# Patient Record
Sex: FEMALE | Race: WHITE | NOT HISPANIC OR LATINO | URBAN - METROPOLITAN AREA
[De-identification: names, ages, dates, MRNs, and addresses within clinical notes are randomized per-mention and may not be internally consistent; named-entity substitution may affect disease eponyms.]

---

## 2021-02-09 ENCOUNTER — INPATIENT (INPATIENT)
Facility: HOSPITAL | Age: 76
LOS: 3 days | Discharge: HOME HEALTH | End: 2021-02-13
Attending: STUDENT IN AN ORGANIZED HEALTH CARE EDUCATION/TRAINING PROGRAM | Admitting: STUDENT IN AN ORGANIZED HEALTH CARE EDUCATION/TRAINING PROGRAM
Payer: MEDICARE

## 2021-02-09 VITALS
RESPIRATION RATE: 16 BRPM | WEIGHT: 125 LBS | HEART RATE: 69 BPM | SYSTOLIC BLOOD PRESSURE: 167 MMHG | DIASTOLIC BLOOD PRESSURE: 72 MMHG | OXYGEN SATURATION: 98 % | TEMPERATURE: 98 F

## 2021-02-09 DIAGNOSIS — Z98.82 BREAST IMPLANT STATUS: Chronic | ICD-10-CM

## 2021-02-09 LAB
ALBUMIN SERPL ELPH-MCNC: 4.2 G/DL — SIGNIFICANT CHANGE UP (ref 3.5–5.2)
ALP SERPL-CCNC: 71 U/L — SIGNIFICANT CHANGE UP (ref 30–115)
ALT FLD-CCNC: 43 U/L — HIGH (ref 0–41)
ANION GAP SERPL CALC-SCNC: 19 MMOL/L — HIGH (ref 7–14)
APPEARANCE UR: CLEAR — SIGNIFICANT CHANGE UP
APTT BLD: 27.6 SEC — SIGNIFICANT CHANGE UP (ref 27–39.2)
AST SERPL-CCNC: 68 U/L — HIGH (ref 0–41)
BACTERIA # UR AUTO: NEGATIVE — SIGNIFICANT CHANGE UP
BASOPHILS # BLD AUTO: 0.04 K/UL — SIGNIFICANT CHANGE UP (ref 0–0.2)
BASOPHILS NFR BLD AUTO: 0.4 % — SIGNIFICANT CHANGE UP (ref 0–1)
BILIRUB SERPL-MCNC: 0.6 MG/DL — SIGNIFICANT CHANGE UP (ref 0.2–1.2)
BILIRUB UR-MCNC: NEGATIVE — SIGNIFICANT CHANGE UP
BLD GP AB SCN SERPL QL: SIGNIFICANT CHANGE UP
BUN SERPL-MCNC: 27 MG/DL — HIGH (ref 10–20)
CALCIUM SERPL-MCNC: 9.7 MG/DL — SIGNIFICANT CHANGE UP (ref 8.5–10.1)
CHLORIDE SERPL-SCNC: 95 MMOL/L — LOW (ref 98–110)
CK SERPL-CCNC: 186 U/L — SIGNIFICANT CHANGE UP (ref 0–225)
CO2 SERPL-SCNC: 23 MMOL/L — SIGNIFICANT CHANGE UP (ref 17–32)
COLOR SPEC: SIGNIFICANT CHANGE UP
CREAT SERPL-MCNC: 1.1 MG/DL — SIGNIFICANT CHANGE UP (ref 0.7–1.5)
DIFF PNL FLD: NEGATIVE — SIGNIFICANT CHANGE UP
EOSINOPHIL # BLD AUTO: 0 K/UL — SIGNIFICANT CHANGE UP (ref 0–0.7)
EOSINOPHIL NFR BLD AUTO: 0 % — SIGNIFICANT CHANGE UP (ref 0–8)
EPI CELLS # UR: 1 /HPF — SIGNIFICANT CHANGE UP (ref 0–5)
ETHANOL SERPL-MCNC: <10 MG/DL — SIGNIFICANT CHANGE UP
GLUCOSE SERPL-MCNC: 87 MG/DL — SIGNIFICANT CHANGE UP (ref 70–99)
GLUCOSE UR QL: NEGATIVE — SIGNIFICANT CHANGE UP
HCT VFR BLD CALC: 39.6 % — SIGNIFICANT CHANGE UP (ref 37–47)
HGB BLD-MCNC: 13.9 G/DL — SIGNIFICANT CHANGE UP (ref 12–16)
HYALINE CASTS # UR AUTO: 6 /LPF — SIGNIFICANT CHANGE UP (ref 0–7)
IMM GRANULOCYTES NFR BLD AUTO: 0.6 % — HIGH (ref 0.1–0.3)
INR BLD: 1.05 RATIO — SIGNIFICANT CHANGE UP (ref 0.65–1.3)
KETONES UR-MCNC: ABNORMAL
LACTATE SERPL-SCNC: 1.3 MMOL/L — SIGNIFICANT CHANGE UP (ref 0.7–2)
LEUKOCYTE ESTERASE UR-ACNC: NEGATIVE — SIGNIFICANT CHANGE UP
LYMPHOCYTES # BLD AUTO: 1.05 K/UL — LOW (ref 1.2–3.4)
LYMPHOCYTES # BLD AUTO: 9.3 % — LOW (ref 20.5–51.1)
MCHC RBC-ENTMCNC: 30.9 PG — SIGNIFICANT CHANGE UP (ref 27–31)
MCHC RBC-ENTMCNC: 35.1 G/DL — SIGNIFICANT CHANGE UP (ref 32–37)
MCV RBC AUTO: 88 FL — SIGNIFICANT CHANGE UP (ref 81–99)
MONOCYTES # BLD AUTO: 0.66 K/UL — HIGH (ref 0.1–0.6)
MONOCYTES NFR BLD AUTO: 5.8 % — SIGNIFICANT CHANGE UP (ref 1.7–9.3)
NEUTROPHILS # BLD AUTO: 9.53 K/UL — HIGH (ref 1.4–6.5)
NEUTROPHILS NFR BLD AUTO: 83.9 % — HIGH (ref 42.2–75.2)
NITRITE UR-MCNC: NEGATIVE — SIGNIFICANT CHANGE UP
NRBC # BLD: 0 /100 WBCS — SIGNIFICANT CHANGE UP (ref 0–0)
PH UR: 6 — SIGNIFICANT CHANGE UP (ref 5–8)
PLATELET # BLD AUTO: 225 K/UL — SIGNIFICANT CHANGE UP (ref 130–400)
POTASSIUM SERPL-MCNC: 3.5 MMOL/L — SIGNIFICANT CHANGE UP (ref 3.5–5)
POTASSIUM SERPL-SCNC: 3.5 MMOL/L — SIGNIFICANT CHANGE UP (ref 3.5–5)
PROT SERPL-MCNC: 6.5 G/DL — SIGNIFICANT CHANGE UP (ref 6–8)
PROT UR-MCNC: ABNORMAL
PROTHROM AB SERPL-ACNC: 12.1 SEC — SIGNIFICANT CHANGE UP (ref 9.95–12.87)
RAPID RVP RESULT: SIGNIFICANT CHANGE UP
RBC # BLD: 4.5 M/UL — SIGNIFICANT CHANGE UP (ref 4.2–5.4)
RBC # FLD: 12.2 % — SIGNIFICANT CHANGE UP (ref 11.5–14.5)
RBC CASTS # UR COMP ASSIST: 2 /HPF — SIGNIFICANT CHANGE UP (ref 0–4)
SARS-COV-2 RNA SPEC QL NAA+PROBE: SIGNIFICANT CHANGE UP
SODIUM SERPL-SCNC: 137 MMOL/L — SIGNIFICANT CHANGE UP (ref 135–146)
SP GR SPEC: 1.02 — SIGNIFICANT CHANGE UP (ref 1.01–1.03)
UROBILINOGEN FLD QL: SIGNIFICANT CHANGE UP
WBC # BLD: 11.35 K/UL — HIGH (ref 4.8–10.8)
WBC # FLD AUTO: 11.35 K/UL — HIGH (ref 4.8–10.8)
WBC UR QL: 1 /HPF — SIGNIFICANT CHANGE UP (ref 0–5)

## 2021-02-09 PROCEDURE — 71045 X-RAY EXAM CHEST 1 VIEW: CPT | Mod: 26

## 2021-02-09 PROCEDURE — 72125 CT NECK SPINE W/O DYE: CPT | Mod: 26

## 2021-02-09 PROCEDURE — 99285 EMERGENCY DEPT VISIT HI MDM: CPT

## 2021-02-09 PROCEDURE — 93010 ELECTROCARDIOGRAM REPORT: CPT

## 2021-02-09 PROCEDURE — 70450 CT HEAD/BRAIN W/O DYE: CPT | Mod: 26

## 2021-02-09 PROCEDURE — 72170 X-RAY EXAM OF PELVIS: CPT | Mod: 26

## 2021-02-09 PROCEDURE — 73560 X-RAY EXAM OF KNEE 1 OR 2: CPT | Mod: 26,RT

## 2021-02-09 PROCEDURE — 73552 X-RAY EXAM OF FEMUR 2/>: CPT | Mod: 26,RT

## 2021-02-09 RX ORDER — TETANUS TOXOID, REDUCED DIPHTHERIA TOXOID AND ACELLULAR PERTUSSIS VACCINE, ADSORBED 5; 2.5; 8; 8; 2.5 [IU]/.5ML; [IU]/.5ML; UG/.5ML; UG/.5ML; UG/.5ML
0.5 SUSPENSION INTRAMUSCULAR ONCE
Refills: 0 | Status: COMPLETED | OUTPATIENT
Start: 2021-02-09 | End: 2021-02-09

## 2021-02-09 RX ORDER — HEPARIN SODIUM 5000 [USP'U]/ML
5000 INJECTION INTRAVENOUS; SUBCUTANEOUS EVERY 12 HOURS
Refills: 0 | Status: DISCONTINUED | OUTPATIENT
Start: 2021-02-09 | End: 2021-02-10

## 2021-02-09 RX ORDER — SIMVASTATIN 20 MG/1
20 TABLET, FILM COATED ORAL AT BEDTIME
Refills: 0 | Status: DISCONTINUED | OUTPATIENT
Start: 2021-02-09 | End: 2021-02-10

## 2021-02-09 RX ORDER — AMLODIPINE BESYLATE 2.5 MG/1
5 TABLET ORAL DAILY
Refills: 0 | Status: DISCONTINUED | OUTPATIENT
Start: 2021-02-09 | End: 2021-02-10

## 2021-02-09 RX ORDER — SODIUM CHLORIDE 9 MG/ML
1000 INJECTION, SOLUTION INTRAVENOUS ONCE
Refills: 0 | Status: COMPLETED | OUTPATIENT
Start: 2021-02-09 | End: 2021-02-09

## 2021-02-09 RX ORDER — ATENOLOL 25 MG/1
50 TABLET ORAL DAILY
Refills: 0 | Status: DISCONTINUED | OUTPATIENT
Start: 2021-02-09 | End: 2021-02-10

## 2021-02-09 RX ORDER — MORPHINE SULFATE 50 MG/1
2 CAPSULE, EXTENDED RELEASE ORAL EVERY 4 HOURS
Refills: 0 | Status: DISCONTINUED | OUTPATIENT
Start: 2021-02-09 | End: 2021-02-10

## 2021-02-09 RX ORDER — AMLODIPINE BESYLATE 2.5 MG/1
1 TABLET ORAL
Qty: 0 | Refills: 0 | DISCHARGE

## 2021-02-09 RX ORDER — SENNA PLUS 8.6 MG/1
2 TABLET ORAL AT BEDTIME
Refills: 0 | Status: DISCONTINUED | OUTPATIENT
Start: 2021-02-09 | End: 2021-02-10

## 2021-02-09 RX ORDER — SIMVASTATIN 20 MG/1
1 TABLET, FILM COATED ORAL
Qty: 0 | Refills: 0 | DISCHARGE

## 2021-02-09 RX ADMIN — TETANUS TOXOID, REDUCED DIPHTHERIA TOXOID AND ACELLULAR PERTUSSIS VACCINE, ADSORBED 0.5 MILLILITER(S): 5; 2.5; 8; 8; 2.5 SUSPENSION INTRAMUSCULAR at 14:42

## 2021-02-09 RX ADMIN — SIMVASTATIN 20 MILLIGRAM(S): 20 TABLET, FILM COATED ORAL at 21:22

## 2021-02-09 RX ADMIN — AMLODIPINE BESYLATE 5 MILLIGRAM(S): 2.5 TABLET ORAL at 22:49

## 2021-02-09 RX ADMIN — MORPHINE SULFATE 2 MILLIGRAM(S): 50 CAPSULE, EXTENDED RELEASE ORAL at 23:14

## 2021-02-09 RX ADMIN — ATENOLOL 50 MILLIGRAM(S): 25 TABLET ORAL at 21:22

## 2021-02-09 RX ADMIN — SODIUM CHLORIDE 500 MILLILITER(S): 9 INJECTION, SOLUTION INTRAVENOUS at 14:43

## 2021-02-09 NOTE — ED PROVIDER NOTE - NS ED ROS FT
Eyes:  No visual changes, eye pain or discharge.  ENMT:  No hearing changes, pain, no sore throat or runny nose, no difficulty swallowing  Cardiac:  No chest pain, SOB or edema. No chest pain with exertion.  Respiratory:  No cough or respiratory distress. No hemoptysis. No history of asthma or RAD.  GI:  No nausea, vomiting, diarrhea or abdominal pain.  :  No dysuria, frequency or burning.  MS:  +r hip pain   Neuro:  No headache or weakness.  No LOC.  Skin: +abrasion to scalp  Endocrine: No history of thyroid disease or diabetes.

## 2021-02-09 NOTE — H&P ADULT - NSHPSOCIALHISTORY_GEN_ALL_CORE
non smoker  no ETOH abuse  no drug misuse  lives with   , walks independently, comfortable with ADL's

## 2021-02-09 NOTE — H&P ADULT - NSHPPHYSICALEXAM_GEN_ALL_CORE
GENERAL: NAD, speaks in full sentences, no signs of respiratory distress  HEAD:  Atraumatic, Normocephalic  EYES: EOMI, PERRLA, non-icteric, no injected sclera  NECK: Supple, No JVD  CHEST/LUNG: Clear to auscultation bilaterally; No wheeze; No crackles; No accessory muscles used  HEART: Regular rate and rhythm; No murmurs;   ABDOMEN: Soft, Nontender, Nondistended; Bowel sounds present; No guarding  EXTREMITIES:  2+ Peripheral Pulses, No cyanosis or edema  PSYCH: AAOx3  NEUROLOGY: non-focal  SKIN: No rashes or lesions GENERAL: NAD, speaks in full sentences, no signs of respiratory distress  HEAD:  posterior bruising noted on the scalp   EYES: EOMI, PERRLA, non-icteric, no injected sclera  NECK: Supple, No JVD  CHEST/LUNG: Clear to auscultation bilaterally; No wheeze; No crackles; No accessory muscles used  HEART: Regular rate and rhythm; No murmurs;   ABDOMEN: Soft, Nontender, Nondistended; Bowel sounds present; No guarding  EXTREMITIES:  2+ Peripheral Pulses, No cyanosis or edema. decrease ROM of right leg with externally rotated hip, no numbness ,  PSYCH: AAOx3  NEUROLOGY: non-focal  SKIN: No rashes or lesions

## 2021-02-09 NOTE — H&P ADULT - ATTENDING COMMENTS
I saw and evaluated the patient. I have reviewed and agree with the findings and plan of care as documented above in the resident’s note (unless indicated differently below). Any necessary changes were made in the body of the text. I saw and evaluated the patient. I have reviewed and agree with the findings and plan of care as documented above in the resident’s note (unless indicated differently below). Any necessary changes were made in the body of the text.    CC: pain - rt side / hip    HPI:  77 yo F pt presenting after a mechanical fall (slipped on a rug and fell) - hitting her head and right side. Denied any prodromal symptoms (chest pain, lightheadedness, dizziness, visual disturbances, palpitations, SOB). Denied LOC. Had pain in rt hip after the fall (sharp/throbbing, radiating distally, moderate to severe, constant, alleviated by staying still, aggravated by movement and by the application of manual pressure). Was unable to weight bear. Called EMS and was brought to the ED thereafter.     ROS:  Constitutional: no fevers; no chills  Eyes: no conjunctivitis; no itching  ENT: no dysphagia; no odynophagia  CVS: no PND; no orthopnea; no chest pain  Resp: no SOB; no coughing  GI: no nausea; no vomiting; no diarrhea; no abd pain  : no dysuria; no hematuria  MSK: +rt hip pain   Skin: no rashes; no ulcers  Neuro: no focal weakness; no headache  All other systems reviewed and are negative    PMHx, home medications, SurHx, FHx and Social history as above in the corresponding sections of the note - reviewed and edited where appropriate    Exam:  Vitals: BP = 119/61; P = 67; T = 99.4; RR = 19; SpO2 95 on room air  General: appears stated age; cooperative  Eyes: anicteric sclera; moist conjunctiva; PERRL; EOMI  HENT: NC; abrasion posterior aspect of scalp over occiput; clear oropharynx w/ moist mucous membranes; nL hard/soft palate  Neck: supple w/ FROM; trachea midline  Lungs: cta b/l with no tachypnea, accessory muscle use, wheezing, rhonci or rales  CVS: RRR; S1 and S2 w/o MRGs  Abd: BS+; soft; non-tender to palpation x 4; no masses or HSM  Ext: no peripheral edema; pulses palpable distally  Skin: normal temp, turgor and texture; no rashes, ulcers or nodules  Neuro: CN II-XII intact; movement limited due to pain/discomfort but is able to move all ext; intact sensation - light touch/temp (w/o sensory level)  Psych: appropriate affect; alert and oriented to person, place, time and situation    Labs reviewed and are significant for WBC 11.35, hemolyzed BMP, AG 19, bicarb 23, hemolyzed BMP, BUN/Cr 27/1.1, AST/ALT 68/43  U/A w/ large ketones  SARS-CoV-2 negative    Imaging reviewed and shows:  --- No acute findings on head CT  --- No acute C-spine fracture or dislocation  --- Small right parietal scalp hematoma w/o calvarial fracture  --- Right subcapital displaced impacted fracture of in varus angulation  --- Hip and lumbar spine degenerative changes; rt knee OA    EKG: NSR; non-specific T-w changes    Assessment:  (1) Rt subcapital displaced impacted hip fracture - varus angulation  (2) Mild (stage I) acute renal failure likely pre-renal  (3) HTN - chronic  (4) Mild AG elevation acidosis possibly 2/2 ketosis (as noted on U/A)  (5) Wt loss - out-pt f/u for age appropriate screening colonoscopy  (6) Abnormal LFT's possibly due to hemolyzed sample - will repeat  (7) Osteoarthritis / physical deconditioning    Plan:  (1) Close clinical monitoring  (2) Fall precautions  (3) PRN analgesics  (4) Pre-op for orthopedic intervention  (5) S/p 1 L fluid bolus; repeat BUN/Cr; monitor urine output  (6) BP controlled; c/w meds  (7) Repeat LFT's  (8) Out-pt f/u and rescheduling of planned colonoscopy  (9) Medications as dosed - reviewed and edited where appropriate  (10) Supportive care: PRN analgesics, antiemetics, antitussives, antipyretics  (11) Disposition:   --- Needs operative intervention for her hip fracture  --- Will likely need PT thereafter  --- Can d/c plan after the procedure (anticipate need for 72 hrs of in-pt care)    Full code    Pre-op risk stratification:  No signs/symptoms of active ACS or decompensated CHF. No known history of CVA or DM. RCRI - class I risk. Functional capacity at baseline at least 4 METS. As such, no additional cardiac testing is necessary at this time (will likely be of limited if any benefit). No other acutely decompensated medical ailment. Stable respiratory status and serum creatinine clearance. BMP was hemolyzed and so the patient is likely hypokalemic. Will supplement electrolytes pre-operatively. Medications reviewed, adjusted as appropriate and can be continued perioperatively. Overall, the patient is likely at a low to intermediate risk for the proposed intermediate risk Orthopedic intervention. Given the morbidity associated with the patient's fracture, the benefits of the intervention likely exceed the potential risks.

## 2021-02-09 NOTE — PATIENT PROFILE ADULT - HEALTH LITERACY UNDERSTANDING DOCTOR- DETAILS
Patient states at times she does need additional help to understand what physicians speak with her about

## 2021-02-09 NOTE — ED PROVIDER NOTE - CLINICAL SUMMARY MEDICAL DECISION MAKING FREE TEXT BOX
75 yo f w/ nonsyncopal fall. ed w/u revealing R fem neck fx.  d/w ortho. will admit for further management

## 2021-02-09 NOTE — ED ADULT NURSE NOTE - OBJECTIVE STATEMENT
BIBA after tripping and falling in her basement. pt has laceration to the back of her head. denies LOC. c/o right sided body pain. ecchymosis noted to pt's right elbow

## 2021-02-09 NOTE — H&P ADULT - ASSESSMENT
75 yo f PMHx oh hypertension presented with right hip fracture.      # Hip fracture   - f/o ortho consult  for possible OR faby  - NPO after midnight  - pain control  - monitor CBC , transfuse if hgb<7  - PT eval   -       # HTN  - c/w home meds          #Misc  - DVT Prophylaxis: heparin SQ  - GI Prophylaxis:   - Diet: DASH  - Activity: bedrest   - Code Status: Full Code    Nephrologist:   Cardiologist:    PCP:    Pharmacy:  77 yo f PMHx oh hypertension presented with right hip fracture.      # Hip fracture   - f/o ortho consult  for possible OR faby  - NPO after midnight  - pain control  - monitor CBC , transfuse if hgb<7  - PT eval         # HTN  - c/w home meds          #Misc  - DVT Prophylaxis: heparin SQ  - GI Prophylaxis: not needed  - Diet: DASH  - Activity: bedrest   - Code Status: Full Code    Pharmacy:  [facility] [address] 77 yo f PMHx oh hypertension presented with right hip fracture.      # Right subcapital displaced impacted fracture :  - f/o ortho consult  for possible OR tomorrow   - NPO after midnight  - pain control  - monitor CBC , transfuse if hgb<7  - PT eval     # DLD:  - c/w simvastatin 20  OD  - f/u lipid profile     # HTN  - c/w home meds    #Misc  - DVT Prophylaxis: heparin SQ  - GI Prophylaxis: not needed  - Diet: DASH  - Activity: bedrest   - Code Status: Full Code    Pharmacy:  [facility] pharmacy [address]

## 2021-02-09 NOTE — H&P ADULT - HISTORY OF PRESENT ILLNESS
75 yo f PMHx oh hypertension presented with right hip fracture.  Patient       Denies any LOC, dizziness, blurry vision, palpitation, chest pain, cough, sob, N/V/D, dysuria, no other complaints.     In the ED Vital Signs were WNL except fot BP T(C): 36.4  HR: 69 BP: 167/72 RR: 16  SpO2: 98% .   CT HEAD/ cervical spine : No acute intracranial findings. Small right parietal scalp hematoma without underlying calvarial fracture. Mild chronic microvascular ischemic changes. No acute fracture or dislocation.  Trauma w/up showed Right subcapital displaced impacted fracture in varus angulation.   77 yo f PMHx oh hypertension presented with right hip fracture.  Patient was getting trip      Denies any LOC, dizziness, blurry vision, palpitation, chest pain, cough, sob, N/V/D, dysuria, no other complaints.     In the ED Vital Signs were WNL except fot BP T(C): 36.4  HR: 69 BP: 167/72 RR: 16  SpO2: 98% .   CT HEAD/ cervical spine : No acute intracranial findings. Small right parietal scalp hematoma without underlying calvarial fracture. Mild chronic microvascular ischemic changes. No acute fracture or dislocation.  Trauma w/up showed Right subcapital displaced impacted fracture in varus angulation.   77 yo F PMHx oh hypertension presented with right hip fracture.    Patient was getting ready to get her colonoscopy today when she tripped with the rug and fell and hit her head and her right side .she denies any LOC ,palpitations, dizziness, blurry vision, seizure like activity.    Also denies any chest pain, cough, sob, N/V/D, dysuria, no other complaints.     Of note: patient had been loosing weight 25 lbs for the last 1 year and was supposed to get colonoscopy for that reason   denies any dark/ bloody BM.  Constipation noted.   patient's grandson had a wild cat inside the house for the last 1 year, fond out that it had "worms" which's she thinks the cause of her weight loss. She went for testing but never got the results.     In the ED Vital Signs were WNL except for BP T(C): 36.4  HR: 69 BP: 167/72 RR: 16  SpO2: 98% .   CT HEAD/ cervical spine : No acute intracranial findings. Small right parietal scalp hematoma without underlying calvarial fracture. Mild chronic microvascular ischemic changes. No acute fracture or dislocation.  Trauma w/up showed Right subcapital displaced impacted fracture in varus angulation.

## 2021-02-09 NOTE — ED PROVIDER NOTE - PHYSICAL EXAMINATION
GENERAL: NAD, speaks in full sentences, no signs of respiratory distress  HEAD:  posterior bruising noted on the scalp   EYES: EOMI, PERRLA, non-icteric, no injected sclera  NECK: Supple, No JVD  CHEST/LUNG: Clear to auscultation bilaterally; No wheeze; No crackles; No accessory muscles used  HEART: Regular rate and rhythm; No murmurs;   ABDOMEN: Soft, Nontender, Nondistended; Bowel sounds present; No guarding  EXTREMITIES:  2+ Peripheral Pulses, No cyanosis or edema. decrease ROM of right leg with externally rotated hip, no numbness ,  PSYCH: AAOx3  NEUROLOGY: non-focal  SKIN: No rashes or lesions

## 2021-02-09 NOTE — ED PROVIDER NOTE - OBJECTIVE STATEMENT
77 yo F PMHx oh hypertension presented after a fall. Patient was getting ready to get her colonoscopy today when she tripped with the rug and fell and hit her head and her right side .she denies any LOC ,palpitations, dizziness, blurry vision, seizure like activity.  Also denies any chest pain, cough, sob, N/V/D, dysuria, no other complaints.

## 2021-02-09 NOTE — ED ADULT NURSE NOTE - NSIMPLEMENTINTERV_GEN_ALL_ED
Implemented All Fall with Harm Risk Interventions:  [place] to call system. Call bell, personal items and telephone within reach. Instruct patient to call for assistance. Room bathroom lighting operational. Non-slip footwear when patient is off stretcher. Physically safe environment: no spills, clutter or unnecessary equipment. Stretcher in lowest position, wheels locked, appropriate side rails in place. Provide visual cue, wrist band, yellow gown, etc. Monitor gait and stability. Monitor for mental status changes and reorient to person, place, and time. Review medications for side effects contributing to fall risk. Reinforce activity limits and safety measures with patient and family. Provide visual clues: red socks.

## 2021-02-09 NOTE — H&P ADULT - NSHPLABSRESULTS_GEN_ALL_CORE
LABS:  02-09 @ 14:20 Creatine 186 U/L [0 - 225]                          13.9   11.35 )-----------( 225      ( 09 Feb 2021 14:20 )             39.6     02-09    137  |  95<L>  |  27<H>  ----------------------------<  87  3.5   |  23  |  1.1    Ca    9.7      09 Feb 2021 14:20    TPro  6.5  /  Alb  4.2  /  TBili  0.6  /  DBili  x   /  AST  68<H>  /  ALT  43<H>  /  AlkPhos  71  02-09    PT/INR - ( 09 Feb 2021 14:20 )   PT: 12.10 sec;   INR: 1.05 ratio         PTT - ( 09 Feb 2021 14:20 )  PTT:27.6 sec    < from: CT Cervical Spine No Cont (02.09.21 @ 14:38) >  IMPRESSION:    CT HEAD:  No acute intracranial findings.  Small right parietal scalp hematoma without underlying calvarial fracture.  Mild chronic microvascular ischemic changes.    CT CERVICAL SPINE:  No acute fracture or dislocation.    < end of copied text >    < from: Xray Femur 2 Views, Right (02.09.21 @ 14:10) >    Findings  impression:   Pelvis: Right subcapital displaced impacted fracture in varus angulation. Moderate right hip degenerative change. Mild left hip degenerative change Lower lumbar moderate degenerative change with moderate facet arthrosis asymmetric left L4-5 bilateral L5-S1.  Right femur: No additional fractures  Right knee: Tricompartmental moderate osteoarthritis with no suprapatellar joint effusion or acute fracture.    < end of copied text >

## 2021-02-09 NOTE — ED PROVIDER NOTE - ATTENDING CONTRIBUTION TO CARE
77 yo f hx htn presents s/p nonsyncopal fall. pt tripped on rug and had standing height fall onto R hip. + head injury. no loc. pt unable to stand/bear weight after. pt c/o R hip pain. no CP/AP/SOB./dizziness/palpitations. +bleeding from occiput. pt not on AC    VS  A: intact, protected  B: breath sounds equal b/l, no cyanosis  C: extremities warm and well perfused  D: GCS 15  E:    H: abrasion to occiput, no active bleeding  N: no nasal septal hematoma  T: oropharynx clear  Card: RRR, nml s1s2  Pulm: CTAB, breath sounds equal  Abd: S, NT, ND  Spine: no C/T/L spine TTP  Pelvis: stable, R hip TTP  Extremities: no gross deformities  Neuro: Awake, alert, orientedx3, no gross focal neuro neuro deficits, moving all extremities    Plan:  Trauma labs  CXR, Pelvis XR, R hip, R knee  CT head/cs    Dispo pending w/u

## 2021-02-10 ENCOUNTER — RESULT REVIEW (OUTPATIENT)
Age: 76
End: 2021-02-10

## 2021-02-10 DIAGNOSIS — S72.001A FRACTURE OF UNSPECIFIED PART OF NECK OF RIGHT FEMUR, INITIAL ENCOUNTER FOR CLOSED FRACTURE: ICD-10-CM

## 2021-02-10 LAB
ALBUMIN SERPL ELPH-MCNC: 3.8 G/DL — SIGNIFICANT CHANGE UP (ref 3.5–5.2)
ALP SERPL-CCNC: 63 U/L — SIGNIFICANT CHANGE UP (ref 30–115)
ALT FLD-CCNC: 33 U/L — SIGNIFICANT CHANGE UP (ref 0–41)
ANION GAP SERPL CALC-SCNC: 15 MMOL/L — HIGH (ref 7–14)
ANION GAP SERPL CALC-SCNC: 15 MMOL/L — HIGH (ref 7–14)
ANION GAP SERPL CALC-SCNC: 16 MMOL/L — HIGH (ref 7–14)
AST SERPL-CCNC: 51 U/L — HIGH (ref 0–41)
BASOPHILS # BLD AUTO: 0.04 K/UL — SIGNIFICANT CHANGE UP (ref 0–0.2)
BASOPHILS NFR BLD AUTO: 0.4 % — SIGNIFICANT CHANGE UP (ref 0–1)
BILIRUB SERPL-MCNC: 0.9 MG/DL — SIGNIFICANT CHANGE UP (ref 0.2–1.2)
BUN SERPL-MCNC: 12 MG/DL — SIGNIFICANT CHANGE UP (ref 10–20)
BUN SERPL-MCNC: 14 MG/DL — SIGNIFICANT CHANGE UP (ref 10–20)
BUN SERPL-MCNC: 15 MG/DL — SIGNIFICANT CHANGE UP (ref 10–20)
CALCIUM SERPL-MCNC: 9.3 MG/DL — SIGNIFICANT CHANGE UP (ref 8.5–10.1)
CALCIUM SERPL-MCNC: 9.3 MG/DL — SIGNIFICANT CHANGE UP (ref 8.5–10.1)
CALCIUM SERPL-MCNC: 9.5 MG/DL — SIGNIFICANT CHANGE UP (ref 8.5–10.1)
CHLORIDE SERPL-SCNC: 96 MMOL/L — LOW (ref 98–110)
CHLORIDE SERPL-SCNC: 97 MMOL/L — LOW (ref 98–110)
CHLORIDE SERPL-SCNC: 98 MMOL/L — SIGNIFICANT CHANGE UP (ref 98–110)
CHOLEST SERPL-MCNC: 191 MG/DL — SIGNIFICANT CHANGE UP
CO2 SERPL-SCNC: 25 MMOL/L — SIGNIFICANT CHANGE UP (ref 17–32)
CO2 SERPL-SCNC: 25 MMOL/L — SIGNIFICANT CHANGE UP (ref 17–32)
CO2 SERPL-SCNC: 26 MMOL/L — SIGNIFICANT CHANGE UP (ref 17–32)
CREAT SERPL-MCNC: 0.8 MG/DL — SIGNIFICANT CHANGE UP (ref 0.7–1.5)
EOSINOPHIL # BLD AUTO: 0.25 K/UL — SIGNIFICANT CHANGE UP (ref 0–0.7)
EOSINOPHIL NFR BLD AUTO: 2.6 % — SIGNIFICANT CHANGE UP (ref 0–8)
GLUCOSE BLDC GLUCOMTR-MCNC: 100 MG/DL — HIGH (ref 70–99)
GLUCOSE BLDC GLUCOMTR-MCNC: 72 MG/DL — SIGNIFICANT CHANGE UP (ref 70–99)
GLUCOSE SERPL-MCNC: 73 MG/DL — SIGNIFICANT CHANGE UP (ref 70–99)
GLUCOSE SERPL-MCNC: 77 MG/DL — SIGNIFICANT CHANGE UP (ref 70–99)
GLUCOSE SERPL-MCNC: 79 MG/DL — SIGNIFICANT CHANGE UP (ref 70–99)
HCT VFR BLD CALC: 36.5 % — LOW (ref 37–47)
HCT VFR BLD CALC: 38.8 % — SIGNIFICANT CHANGE UP (ref 37–47)
HCV AB S/CO SERPL IA: 0.03 COI — SIGNIFICANT CHANGE UP
HCV AB SERPL-IMP: SIGNIFICANT CHANGE UP
HDLC SERPL-MCNC: 79 MG/DL — SIGNIFICANT CHANGE UP
HGB BLD-MCNC: 13.1 G/DL — SIGNIFICANT CHANGE UP (ref 12–16)
HGB BLD-MCNC: 13.3 G/DL — SIGNIFICANT CHANGE UP (ref 12–16)
IMM GRANULOCYTES NFR BLD AUTO: 0.2 % — SIGNIFICANT CHANGE UP (ref 0.1–0.3)
LIPID PNL WITH DIRECT LDL SERPL: 106 MG/DL — HIGH
LYMPHOCYTES # BLD AUTO: 1.4 K/UL — SIGNIFICANT CHANGE UP (ref 1.2–3.4)
LYMPHOCYTES # BLD AUTO: 14.8 % — LOW (ref 20.5–51.1)
MAGNESIUM SERPL-MCNC: 1.4 MG/DL — LOW (ref 1.8–2.4)
MCHC RBC-ENTMCNC: 30.5 PG — SIGNIFICANT CHANGE UP (ref 27–31)
MCHC RBC-ENTMCNC: 31.1 PG — HIGH (ref 27–31)
MCHC RBC-ENTMCNC: 34.3 G/DL — SIGNIFICANT CHANGE UP (ref 32–37)
MCHC RBC-ENTMCNC: 35.9 G/DL — SIGNIFICANT CHANGE UP (ref 32–37)
MCV RBC AUTO: 86.7 FL — SIGNIFICANT CHANGE UP (ref 81–99)
MCV RBC AUTO: 89 FL — SIGNIFICANT CHANGE UP (ref 81–99)
MONOCYTES # BLD AUTO: 0.77 K/UL — HIGH (ref 0.1–0.6)
MONOCYTES NFR BLD AUTO: 8.1 % — SIGNIFICANT CHANGE UP (ref 1.7–9.3)
NEUTROPHILS # BLD AUTO: 6.97 K/UL — HIGH (ref 1.4–6.5)
NEUTROPHILS NFR BLD AUTO: 73.9 % — SIGNIFICANT CHANGE UP (ref 42.2–75.2)
NON HDL CHOLESTEROL: 112 MG/DL — SIGNIFICANT CHANGE UP
NRBC # BLD: 0 /100 WBCS — SIGNIFICANT CHANGE UP (ref 0–0)
NRBC # BLD: 0 /100 WBCS — SIGNIFICANT CHANGE UP (ref 0–0)
PLATELET # BLD AUTO: 192 K/UL — SIGNIFICANT CHANGE UP (ref 130–400)
PLATELET # BLD AUTO: 215 K/UL — SIGNIFICANT CHANGE UP (ref 130–400)
POTASSIUM SERPL-MCNC: 2.8 MMOL/L — LOW (ref 3.5–5)
POTASSIUM SERPL-MCNC: 3.2 MMOL/L — LOW (ref 3.5–5)
POTASSIUM SERPL-MCNC: 3.9 MMOL/L — SIGNIFICANT CHANGE UP (ref 3.5–5)
POTASSIUM SERPL-SCNC: 2.8 MMOL/L — LOW (ref 3.5–5)
POTASSIUM SERPL-SCNC: 3.2 MMOL/L — LOW (ref 3.5–5)
POTASSIUM SERPL-SCNC: 3.9 MMOL/L — SIGNIFICANT CHANGE UP (ref 3.5–5)
PROT SERPL-MCNC: 5.5 G/DL — LOW (ref 6–8)
RBC # BLD: 4.21 M/UL — SIGNIFICANT CHANGE UP (ref 4.2–5.4)
RBC # BLD: 4.36 M/UL — SIGNIFICANT CHANGE UP (ref 4.2–5.4)
RBC # FLD: 12.3 % — SIGNIFICANT CHANGE UP (ref 11.5–14.5)
RBC # FLD: 12.3 % — SIGNIFICANT CHANGE UP (ref 11.5–14.5)
SODIUM SERPL-SCNC: 137 MMOL/L — SIGNIFICANT CHANGE UP (ref 135–146)
SODIUM SERPL-SCNC: 137 MMOL/L — SIGNIFICANT CHANGE UP (ref 135–146)
SODIUM SERPL-SCNC: 139 MMOL/L — SIGNIFICANT CHANGE UP (ref 135–146)
TRIGL SERPL-MCNC: 67 MG/DL — SIGNIFICANT CHANGE UP
WBC # BLD: 12.98 K/UL — HIGH (ref 4.8–10.8)
WBC # BLD: 9.45 K/UL — SIGNIFICANT CHANGE UP (ref 4.8–10.8)
WBC # FLD AUTO: 12.98 K/UL — HIGH (ref 4.8–10.8)
WBC # FLD AUTO: 9.45 K/UL — SIGNIFICANT CHANGE UP (ref 4.8–10.8)

## 2021-02-10 PROCEDURE — 99223 1ST HOSP IP/OBS HIGH 75: CPT

## 2021-02-10 PROCEDURE — 88305 TISSUE EXAM BY PATHOLOGIST: CPT | Mod: 26

## 2021-02-10 PROCEDURE — 88311 DECALCIFY TISSUE: CPT | Mod: 26

## 2021-02-10 RX ORDER — POTASSIUM CHLORIDE 20 MEQ
40 PACKET (EA) ORAL ONCE
Refills: 0 | Status: COMPLETED | OUTPATIENT
Start: 2021-02-10 | End: 2021-02-10

## 2021-02-10 RX ORDER — MORPHINE SULFATE 50 MG/1
4 CAPSULE, EXTENDED RELEASE ORAL
Refills: 0 | Status: DISCONTINUED | OUTPATIENT
Start: 2021-02-10 | End: 2021-02-10

## 2021-02-10 RX ORDER — SENNA PLUS 8.6 MG/1
2 TABLET ORAL AT BEDTIME
Refills: 0 | Status: DISCONTINUED | OUTPATIENT
Start: 2021-02-10 | End: 2021-02-13

## 2021-02-10 RX ORDER — CEFAZOLIN SODIUM 1 G
2000 VIAL (EA) INJECTION EVERY 8 HOURS
Refills: 0 | Status: COMPLETED | OUTPATIENT
Start: 2021-02-10 | End: 2021-02-11

## 2021-02-10 RX ORDER — SODIUM CHLORIDE 9 MG/ML
1000 INJECTION, SOLUTION INTRAVENOUS
Refills: 0 | Status: DISCONTINUED | OUTPATIENT
Start: 2021-02-10 | End: 2021-02-10

## 2021-02-10 RX ORDER — POTASSIUM CHLORIDE 20 MEQ
20 PACKET (EA) ORAL
Refills: 0 | Status: COMPLETED | OUTPATIENT
Start: 2021-02-10 | End: 2021-02-10

## 2021-02-10 RX ORDER — SODIUM CHLORIDE 9 MG/ML
1000 INJECTION INTRAMUSCULAR; INTRAVENOUS; SUBCUTANEOUS
Refills: 0 | Status: DISCONTINUED | OUTPATIENT
Start: 2021-02-10 | End: 2021-02-10

## 2021-02-10 RX ORDER — AMLODIPINE BESYLATE 2.5 MG/1
5 TABLET ORAL DAILY
Refills: 0 | Status: DISCONTINUED | OUTPATIENT
Start: 2021-02-10 | End: 2021-02-13

## 2021-02-10 RX ORDER — POTASSIUM CHLORIDE 20 MEQ
20 PACKET (EA) ORAL ONCE
Refills: 0 | Status: COMPLETED | OUTPATIENT
Start: 2021-02-10 | End: 2021-02-10

## 2021-02-10 RX ORDER — HEPARIN SODIUM 5000 [USP'U]/ML
5000 INJECTION INTRAVENOUS; SUBCUTANEOUS EVERY 12 HOURS
Refills: 0 | Status: DISCONTINUED | OUTPATIENT
Start: 2021-02-11 | End: 2021-02-13

## 2021-02-10 RX ORDER — MORPHINE SULFATE 50 MG/1
2 CAPSULE, EXTENDED RELEASE ORAL EVERY 4 HOURS
Refills: 0 | Status: DISCONTINUED | OUTPATIENT
Start: 2021-02-10 | End: 2021-02-12

## 2021-02-10 RX ORDER — HYDROMORPHONE HYDROCHLORIDE 2 MG/ML
0.5 INJECTION INTRAMUSCULAR; INTRAVENOUS; SUBCUTANEOUS
Refills: 0 | Status: DISCONTINUED | OUTPATIENT
Start: 2021-02-10 | End: 2021-02-10

## 2021-02-10 RX ORDER — MAGNESIUM SULFATE 500 MG/ML
2 VIAL (ML) INJECTION
Refills: 0 | Status: COMPLETED | OUTPATIENT
Start: 2021-02-10 | End: 2021-02-10

## 2021-02-10 RX ORDER — ONDANSETRON 8 MG/1
4 TABLET, FILM COATED ORAL ONCE
Refills: 0 | Status: DISCONTINUED | OUTPATIENT
Start: 2021-02-10 | End: 2021-02-10

## 2021-02-10 RX ADMIN — Medication 50 MILLIEQUIVALENT(S): at 13:35

## 2021-02-10 RX ADMIN — Medication 25 GRAM(S): at 13:35

## 2021-02-10 RX ADMIN — Medication 100 MILLIGRAM(S): at 22:35

## 2021-02-10 RX ADMIN — Medication 25 GRAM(S): at 11:55

## 2021-02-10 RX ADMIN — Medication 40 MILLIEQUIVALENT(S): at 11:13

## 2021-02-10 RX ADMIN — Medication 50 MILLIEQUIVALENT(S): at 11:55

## 2021-02-10 NOTE — PROGRESS NOTE ADULT - SUBJECTIVE AND OBJECTIVE BOX
Patient planned for right hip hemiarthroplasty vs total hip arthroplasty for right femoral neck fracture; pending medical clearance    Patient potassium 2.8 today at 9:39 am.     Plan for repletion then repeat BMP    Orthopedics to follow, continue to keep NPO, if potassium repletion is not sufficient, will not be cleared to go to OR, can feed at that point, will reschedule for OR

## 2021-02-10 NOTE — PROGRESS NOTE ADULT - SUBJECTIVE AND OBJECTIVE BOX
Patient seen and examined at bedside post-operatively. Pain well controlled. No complaints at this time.     MEDICATIONS  (STANDING):  amLODIPine   Tablet 5 milliGRAM(s) Oral daily  ceFAZolin   IVPB 2000 milliGRAM(s) IV Intermittent every 8 hours  Heparin SQ to be started tmmrw    MEDICATIONS  (PRN):  morphine  - Injectable 2 milliGRAM(s) IV Push every 4 hours PRN Moderate Pain (4 - 6)  senna 2 Tablet(s) Oral at bedtime PRN Constipation      Physical exam  Vital Signs Last 24 Hrs  T(C): 36.1 (10 Feb 2021 21:05), Max: 37.4 (10 Feb 2021 04:26)  T(F): 97 (10 Feb 2021 21:05), Max: 99.4 (10 Feb 2021 04:26)  HR: 71 (10 Feb 2021 21:05) (63 - 82)  BP: 122/58 (10 Feb 2021 21:05) (93/51 - 139/64)  BP(mean): 74 (10 Feb 2021 18:55) (74 - 92)  RR: 16 (10 Feb 2021 21:05) (14 - 20)  SpO2: 96% (10 Feb 2021 20:45) (95% - 99%)    NAD, AOx3  Non-labored breathing   Right hip   Dressing C/D/I  EHL/FHL/GA/TA Motor intact  SP/DP/T/S/S SILT distally  Toes WWP    Assessment/plan  76F POD#0 s/p R hip doni arthroplasty, doing well    Pain control  DVT ppx  IV Abx x24hrs  WBAT  OOBTC/IS  PT/OT  Continue Home meds  AM labs

## 2021-02-10 NOTE — PROGRESS NOTE ADULT - SUBJECTIVE AND OBJECTIVE BOX
NADINE BENEDICT 76y Female  MRN#: [id number]   CODE STATUS: Full       SUBJECTIVE  Patient is a 76y old Female who presents with a chief complaint of right hip fracture (2021 19:12)  Currently admitted to medicine with the primary diagnosis of Closed fracture of neck of right femur, initial encounter    Today is hospital day 1d, and this morning she is resting in bed and is still having some pain in her right leg. She has not been asking for pain meds so I told her to make sure she asks the nurse.  No overnight events.       OBJECTIVE  PAST MEDICAL & SURGICAL HISTORY  Hyperlipemia    HTN (hypertension)    H/O breast implant      ALLERGIES:  No Known Allergies    MEDICATIONS:  STANDING MEDICATIONS  amLODIPine   Tablet 5 milliGRAM(s) Oral daily  ATENolol  Tablet 50 milliGRAM(s) Oral daily  heparin   Injectable 5000 Unit(s) SubCutaneous every 12 hours  magnesium sulfate  IVPB 2 Gram(s) IV Intermittent every 2 hours  potassium chloride    Tablet ER 40 milliEquivalent(s) Oral once  potassium chloride  20 mEq/100 mL IVPB 20 milliEquivalent(s) IV Intermittent once  simvastatin 20 milliGRAM(s) Oral at bedtime    PRN MEDICATIONS  morphine  - Injectable 2 milliGRAM(s) IV Push every 4 hours PRN  senna 2 Tablet(s) Oral at bedtime PRN      VITAL SIGNS: Last 24 Hours  T(C): 37.4 (10 Feb 2021 04:26), Max: 37.4 (10 Feb 2021 04:26)  T(F): 99.4 (10 Feb 2021 04:26), Max: 99.4 (10 Feb 2021 04:26)  HR: 67 (10 Feb 2021 06:48) (67 - 80)  BP: 119/61 (10 Feb 2021 06:48) (106/59 - 167/72)  BP(mean): 92 (2021 22:09) (92 - 92)  RR: 19 (10 Feb 2021 04:26) (16 - 19)  SpO2: 95% (2021 22:09) (95% - 98%)    LABS:                        13.1   9.45  )-----------( 215      ( 10 Feb 2021 05:51 )             36.5     02-10    137  |  97<L>  |  15  ----------------------------<  79  3.2<L>   |  25  |  0.8    Ca    9.3      10 Feb 2021 05:51  Mg     1.4     02-10    TPro  5.5<L>  /  Alb  3.8  /  TBili  0.9  /  DBili  x   /  AST  51<H>  /  ALT  33  /  AlkPhos  63  02-10    PT/INR - ( 2021 14:20 )   PT: 12.10 sec;   INR: 1.05 ratio         PTT - ( 2021 14:20 )  PTT:27.6 sec  Urinalysis Basic - ( 2021 17:46 )    Color: Light Yellow / Appearance: Clear / S.022 / pH: x  Gluc: x / Ketone: Large  / Bili: Negative / Urobili: <2 mg/dL   Blood: x / Protein: 30 mg/dL / Nitrite: Negative   Leuk Esterase: Negative / RBC: 2 /HPF / WBC 1 /HPF   Sq Epi: x / Non Sq Epi: 1 /HPF / Bacteria: Negative        Lactate, Blood: 1.3 mmol/L (21 @ 14:20)  Creatine Kinase, Serum: 186 U/L (21 @ 14:20)      CARDIAC MARKERS ( 2021 14:20 )  x     / x     / 186 U/L / x     / x          RADIOLOGY:    < from: CT Head No Cont (21 @ 14:31) >  CT HEAD:  No acute intracranial findings.    Small right parietal scalp hematoma without underlying calvarial fracture.    Mild chronic microvascular ischemic changes.    CT CERVICAL SPINE:  No acute fracture or dislocation.    < end of copied text >    PHYSICAL EXAM:  GENERAL: NAD, frail appearing, AAOx3  HEENT:  Atraumatic, Normocephalic. EOMI, conjunctiva and sclera clear, No JVD  PULMONARY: Clear to auscultation bilaterally; No wheeze  CARDIOVASCULAR: Regular rate and rhythm; No murmurs, rubs, or gallops  GASTROINTESTINAL: Soft, Nontender, Nondistended; Bowel sounds present  MUSCULOSKELETAL: No clubbing, cyanosis, or edema  NEUROLOGY: non-focal  SKIN: No rashes or lesions      ASSESSMENT & PLAN  77 yo f PMHx oh hypertension presented with right hip fracture.    # Right subcapital displaced impacted fracture 2/2 mechanical fall, no LOC   - CT head: no intracranial pathology   - X-ray Pelvis Right subcapital displaced impacted fracture in varus angulation.   - ortho consult  for OR today R hip hemiarthroplasty vs. total hip arthroplasty  - pain control: morphine 2mgh IV q 4 hrs PRN  - PT eval     # DLD:  - Lipid Profile: Chol 191, TG 67, HDL 79,    - c/w simvastatin 20  qd     # HTN  - c/w home meds    #Misc  - DVT Prophylaxis: heparin SQ  - GI Prophylaxis: not needed  - Diet: DASH  - Activity: bedrest   - Code Status: Full Code    Pharmacy:  [facility] pharmacy [address]

## 2021-02-10 NOTE — CHART NOTE - NSCHARTNOTEFT_GEN_A_CORE
PACU ANESTHESIA ADMISSION NOTE      Procedure: right hip hemiarthroplasty  Post op diagnosis:  right hip fracture      __x__  Patent Airway    _x___  Full return of protective reflexes    __x__  Full recovery from anesthesia / back to baseline     Vitals:   see anesthesia record      Mental Status:  _x___ Awake   __x___ Alert   _____ Drowsy   _____ Sedated    Nausea/Vomiting:  __x__ NO  ______Yes,   See Post - Op Orders          Pain Scale (0-10):  _____    Treatment: ____ None    ___x_ See Post - Op/PCA Orders    Post - Operative Fluids:   ____ Oral   __x__ See Post - Op Orders    Plan: Discharge:   __x__Home       _____Floor     _____Critical Care    _____  Other:_________________    Comments:  Uneventful intraoperative course. No anesthesia issues or complications noted. Patient stable upon arrival to PACU. Report given to RN. Discharge when criteria met.

## 2021-02-11 LAB
24R-OH-CALCIDIOL SERPL-MCNC: 62 NG/ML — SIGNIFICANT CHANGE UP (ref 30–80)
A1C WITH ESTIMATED AVERAGE GLUCOSE RESULT: 5.8 % — HIGH (ref 4–5.6)
ALBUMIN SERPL ELPH-MCNC: 3.9 G/DL — SIGNIFICANT CHANGE UP (ref 3.5–5.2)
ALP SERPL-CCNC: 72 U/L — SIGNIFICANT CHANGE UP (ref 30–115)
ALT FLD-CCNC: 29 U/L — SIGNIFICANT CHANGE UP (ref 0–41)
ANION GAP SERPL CALC-SCNC: 13 MMOL/L — SIGNIFICANT CHANGE UP (ref 7–14)
AST SERPL-CCNC: 46 U/L — HIGH (ref 0–41)
BASOPHILS # BLD AUTO: 0.02 K/UL — SIGNIFICANT CHANGE UP (ref 0–0.2)
BASOPHILS NFR BLD AUTO: 0.2 % — SIGNIFICANT CHANGE UP (ref 0–1)
BILIRUB SERPL-MCNC: 0.5 MG/DL — SIGNIFICANT CHANGE UP (ref 0.2–1.2)
BUN SERPL-MCNC: 12 MG/DL — SIGNIFICANT CHANGE UP (ref 10–20)
CALCIUM SERPL-MCNC: 9.3 MG/DL — SIGNIFICANT CHANGE UP (ref 8.5–10.1)
CHLORIDE SERPL-SCNC: 98 MMOL/L — SIGNIFICANT CHANGE UP (ref 98–110)
CO2 SERPL-SCNC: 27 MMOL/L — SIGNIFICANT CHANGE UP (ref 17–32)
CREAT SERPL-MCNC: 1.1 MG/DL — SIGNIFICANT CHANGE UP (ref 0.7–1.5)
EOSINOPHIL # BLD AUTO: 0.04 K/UL — SIGNIFICANT CHANGE UP (ref 0–0.7)
EOSINOPHIL NFR BLD AUTO: 0.3 % — SIGNIFICANT CHANGE UP (ref 0–8)
ESTIMATED AVERAGE GLUCOSE: 120 MG/DL — HIGH (ref 68–114)
GLUCOSE BLDC GLUCOMTR-MCNC: 86 MG/DL — SIGNIFICANT CHANGE UP (ref 70–99)
GLUCOSE SERPL-MCNC: 85 MG/DL — SIGNIFICANT CHANGE UP (ref 70–99)
HCT VFR BLD CALC: 36.3 % — LOW (ref 37–47)
HCT VFR BLD CALC: 36.8 % — LOW (ref 37–47)
HGB BLD-MCNC: 12.7 G/DL — SIGNIFICANT CHANGE UP (ref 12–16)
HGB BLD-MCNC: 12.9 G/DL — SIGNIFICANT CHANGE UP (ref 12–16)
IMM GRANULOCYTES NFR BLD AUTO: 0.3 % — SIGNIFICANT CHANGE UP (ref 0.1–0.3)
LYMPHOCYTES # BLD AUTO: 1.25 K/UL — SIGNIFICANT CHANGE UP (ref 1.2–3.4)
LYMPHOCYTES # BLD AUTO: 10.1 % — LOW (ref 20.5–51.1)
MAGNESIUM SERPL-MCNC: 2.1 MG/DL — SIGNIFICANT CHANGE UP (ref 1.8–2.4)
MCHC RBC-ENTMCNC: 30.8 PG — SIGNIFICANT CHANGE UP (ref 27–31)
MCHC RBC-ENTMCNC: 31.2 PG — HIGH (ref 27–31)
MCHC RBC-ENTMCNC: 35 G/DL — SIGNIFICANT CHANGE UP (ref 32–37)
MCHC RBC-ENTMCNC: 35.1 G/DL — SIGNIFICANT CHANGE UP (ref 32–37)
MCV RBC AUTO: 87.9 FL — SIGNIFICANT CHANGE UP (ref 81–99)
MCV RBC AUTO: 89.1 FL — SIGNIFICANT CHANGE UP (ref 81–99)
MONOCYTES # BLD AUTO: 0.84 K/UL — HIGH (ref 0.1–0.6)
MONOCYTES NFR BLD AUTO: 6.8 % — SIGNIFICANT CHANGE UP (ref 1.7–9.3)
NEUTROPHILS # BLD AUTO: 10.15 K/UL — HIGH (ref 1.4–6.5)
NEUTROPHILS NFR BLD AUTO: 82.3 % — HIGH (ref 42.2–75.2)
NRBC # BLD: 0 /100 WBCS — SIGNIFICANT CHANGE UP (ref 0–0)
NRBC # BLD: 0 /100 WBCS — SIGNIFICANT CHANGE UP (ref 0–0)
PLATELET # BLD AUTO: 219 K/UL — SIGNIFICANT CHANGE UP (ref 130–400)
PLATELET # BLD AUTO: 253 K/UL — SIGNIFICANT CHANGE UP (ref 130–400)
POTASSIUM SERPL-MCNC: 3.8 MMOL/L — SIGNIFICANT CHANGE UP (ref 3.5–5)
POTASSIUM SERPL-SCNC: 3.8 MMOL/L — SIGNIFICANT CHANGE UP (ref 3.5–5)
PROT SERPL-MCNC: 5.7 G/DL — LOW (ref 6–8)
RBC # BLD: 4.13 M/UL — LOW (ref 4.2–5.4)
RBC # BLD: 4.13 M/UL — LOW (ref 4.2–5.4)
RBC # FLD: 12.3 % — SIGNIFICANT CHANGE UP (ref 11.5–14.5)
RBC # FLD: 12.4 % — SIGNIFICANT CHANGE UP (ref 11.5–14.5)
SODIUM SERPL-SCNC: 138 MMOL/L — SIGNIFICANT CHANGE UP (ref 135–146)
WBC # BLD: 12.34 K/UL — HIGH (ref 4.8–10.8)
WBC # BLD: 14.12 K/UL — HIGH (ref 4.8–10.8)
WBC # FLD AUTO: 12.34 K/UL — HIGH (ref 4.8–10.8)
WBC # FLD AUTO: 14.12 K/UL — HIGH (ref 4.8–10.8)

## 2021-02-11 PROCEDURE — 99233 SBSQ HOSP IP/OBS HIGH 50: CPT

## 2021-02-11 RX ORDER — ATENOLOL 25 MG/1
50 TABLET ORAL DAILY
Refills: 0 | Status: DISCONTINUED | OUTPATIENT
Start: 2021-02-11 | End: 2021-02-13

## 2021-02-11 RX ORDER — SIMVASTATIN 20 MG/1
20 TABLET, FILM COATED ORAL AT BEDTIME
Refills: 0 | Status: DISCONTINUED | OUTPATIENT
Start: 2021-02-11 | End: 2021-02-13

## 2021-02-11 RX ADMIN — Medication 100 MILLIGRAM(S): at 05:47

## 2021-02-11 RX ADMIN — SIMVASTATIN 20 MILLIGRAM(S): 20 TABLET, FILM COATED ORAL at 21:47

## 2021-02-11 RX ADMIN — Medication 100 MILLIGRAM(S): at 13:27

## 2021-02-11 RX ADMIN — HEPARIN SODIUM 5000 UNIT(S): 5000 INJECTION INTRAVENOUS; SUBCUTANEOUS at 16:12

## 2021-02-11 RX ADMIN — AMLODIPINE BESYLATE 5 MILLIGRAM(S): 2.5 TABLET ORAL at 05:47

## 2021-02-11 RX ADMIN — HEPARIN SODIUM 5000 UNIT(S): 5000 INJECTION INTRAVENOUS; SUBCUTANEOUS at 05:47

## 2021-02-11 RX ADMIN — ATENOLOL 50 MILLIGRAM(S): 25 TABLET ORAL at 09:36

## 2021-02-11 NOTE — PHYSICAL THERAPY INITIAL EVALUATION ADULT - GAIT DEVIATIONS NOTED, PT EVAL
Decreased hip and knee flexion/decreased otf/decreased step length/decreased stride length/increased stride width

## 2021-02-11 NOTE — DIETITIAN INITIAL EVALUATION ADULT. - PHYSCIAL ASSESSMENT
Alert/confused; no edema noted per RN flowsheets; GI: pt feels constipated, LBM 2/8 -- has had no BM since then; Skin: bruised/ecchymotic per RN flowsheets ; Wts in EMR: 56.7kg (2/9) vs. 57.6kg (2/10) dosing wt

## 2021-02-11 NOTE — PHYSICAL THERAPY INITIAL EVALUATION ADULT - GENERAL OBSERVATIONS, REHAB EVAL
Pt currently on bed rest orders, As per resident Giles via phone call x [phone]. Pt is scheduled for R hip hemiarthroplasty vs. total hip arthroplasty today. Pt is on hold. PT will follow up tomorrow.
Pt rec'd in bed awake, pleasant and cooperative, with no complaints. Pt was very anxious during the session and during ambulation began to feel nauseous that subsided with a sitting rest break of 3 min. Pt left sitting in b/s chair in No apparent distress. RN made aware of patient's bout of nausea.

## 2021-02-11 NOTE — OCCUPATIONAL THERAPY INITIAL EVALUATION ADULT - PHYSICAL ASSIST/NONPHYSICAL ASSIST: EATING, OT EVAL
requires food to be cut up. As per pt report, this is baseline. Spouse often cuts food for patient./set-up required

## 2021-02-11 NOTE — DIETITIAN INITIAL EVALUATION ADULT. - PERTINENT MEDS FT
MEDICATIONS  (STANDING):  amLODIPine   Tablet 5 milliGRAM(s) Oral daily  ATENolol  Tablet 50 milliGRAM(s) Oral daily  heparin   Injectable 5000 Unit(s) SubCutaneous every 12 hours  simvastatin 20 milliGRAM(s) Oral at bedtime    MEDICATIONS  (PRN):  morphine  - Injectable 2 milliGRAM(s) IV Push every 4 hours PRN Moderate Pain (4 - 6)  senna 2 Tablet(s) Oral at bedtime PRN Constipation

## 2021-02-11 NOTE — DIETITIAN INITIAL EVALUATION ADULT. - OTHER INFO
nut hx cont: vit c/d at home. no cul/rel or personal food rest/prefs. Tried to confirm wt loss + nut hx w/ family via (#[id number]) -- no answer a this time.     pertinent medical information:   # Right subcapital displaced impacted fracture 2/2 mechanical fall, no LOC   # Leucocytosis - likely reactive, no signs of acute infection, no fever, no symptoms, monitor for now  #H/o HTN, HLD noted     pertinent subjective information: reports po/appetite poor, <25%, had some eggs and oatmeal at BF. no chewing/swallowing difficulty noted per pt.

## 2021-02-11 NOTE — DIETITIAN INITIAL EVALUATION ADULT. - ADD RECOMMEND
1. add ensure enlive TID. 2. pt complained of constipation -- opn senna, will cont to monitor if more aggressive regimen is needed. pt @ risk, RD to f/u in 4 days. 1. add ensure enlive TID. 2. pt complained of constipation -- on senna, will cont to monitor if more aggressive regimen is needed. 3. Continue w/ current diet order. pt @ risk, RD to f/u in 4 days.

## 2021-02-11 NOTE — PHYSICAL THERAPY INITIAL EVALUATION ADULT - ADDITIONAL COMMENTS
Pt states that she lives in a PH with her  and grandson. There are 3-4 GABI, then 1 FOS to the basement.

## 2021-02-11 NOTE — DIETITIAN INITIAL EVALUATION ADULT. - ORAL INTAKE PTA/DIET HISTORY
PTA po/appetite ~75%. says she eats 3 meals a day. UBW: 68.1kg vs wt at admit: 57.6kg -- says she has lost wt over past year and doesn't know why that has happened exactly. Doesn't report significant loss of appetite. says her grandson brought a stray cat home who started losing wt because it had worms growing inside it according to the vet -- so she thought maybe that is why she lost wt too. she hasn't gotten the chance to get herself checked yet. % wt change: 15%. NKFA. One-a-day MVI and also

## 2021-02-11 NOTE — OCCUPATIONAL THERAPY INITIAL EVALUATION ADULT - ADL RETRAINING, OT EVAL
In one week, pt will demonstrate max assist for LB dressing with use of assistive equipment, as needed.

## 2021-02-11 NOTE — PROGRESS NOTE ADULT - SUBJECTIVE AND OBJECTIVE BOX
Patient seen and examined at bedside in the am. No acute events overnight. Pain well controlled. No complaints at this time.     MEDICATIONS  (STANDING):  amLODIPine   Tablet 5 milliGRAM(s) Oral daily  ceFAZolin   IVPB 2000 milliGRAM(s) IV Intermittent every 8 hours  heparin   Injectable 5000 Unit(s) SubCutaneous every 12 hours    MEDICATIONS  (PRN):  morphine  - Injectable 2 milliGRAM(s) IV Push every 4 hours PRN Moderate Pain (4 - 6)  senna 2 Tablet(s) Oral at bedtime PRN Constipation    Physical exam  Vital Signs Last 24 Hrs  T(C): 35.9 (11 Feb 2021 04:56), Max: 37.1 (10 Feb 2021 15:42)  T(F): 96.7 (11 Feb 2021 04:56), Max: 98.7 (10 Feb 2021 15:42)  HR: 71 (11 Feb 2021 04:56) (63 - 82)  BP: 124/63 (11 Feb 2021 04:56) (93/51 - 139/64)  BP(mean): 74 (10 Feb 2021 18:55) (74 - 81)  RR: 18 (11 Feb 2021 04:56) (14 - 20)  SpO2: 96% (10 Feb 2021 20:45) (96% - 99%)    NAD, AOx3  Non-labored breathing   Right hip   Dressing C/D/I  EHL/FHL/GA/TA Motor intact  SP/DP/T/S/S SILT distally  Toes WWP                          13.3   12.98 )-----------( 192      ( 10 Feb 2021 19:58 )             38.8       Assessment/plan  76F POD#1 s/p R hip doni arthroplasty, doing well    Pain control  DVT ppx  IV Abx x24hrs  WBAT  OOBTC/IS  PT/OT  Continue Home meds  AM labs

## 2021-02-11 NOTE — OCCUPATIONAL THERAPY INITIAL EVALUATION ADULT - LEVEL OF INDEPENDENCE: DRESS UPPER BODY, OT EVAL
As per pt report, is baseline. Spouse often performs all clothing fasteners tasks for pt./minimum assist (75% patients effort)

## 2021-02-11 NOTE — OCCUPATIONAL THERAPY INITIAL EVALUATION ADULT - IMPAIRMENTS CONTRIBUTING IMPAIRED BED MOBILITY, REHAB EVAL
c/o pain 5/10 VAS R hip during movement/activity. Managed by rest and repositioning. Full relief. RN made aware./pain/decreased ROM/decreased strength

## 2021-02-11 NOTE — DIETITIAN INITIAL EVALUATION ADULT. - PERSON TAUGHT/METHOD
discussed nutritional supplements + importance of po intake; discussed HTN/HLD nut therapy/verbal instruction/patient instructed

## 2021-02-11 NOTE — CONSULT NOTE ADULT - SUBJECTIVE AND OBJECTIVE BOX
HPI: notes noted  77 yo F PMHx oh hypertension presented with right hip fracture.    Patient was getting ready to get her colonoscopy today when she tripped with the rug and fell and hit her head and her right side .she denies any LOC ,palpitations, dizziness, blurry vision, seizure like activity.    Also denies any chest pain, cough, sob, N/V/D, dysuria, no other complaints.     Of note: patient had been loosing weight 25 lbs for the last 1 year and was supposed to get colonoscopy for that reason   denies any dark/ bloody BM.  Constipation noted.   patient's grandson had a wild cat inside the house for the last 1 year, fond out that it had "worms" which's she thinks the cause of her weight loss. She went for testing but never got the results.     In the ED Vital Signs were WNL except for BP T(C): 36.4  HR: 69 BP: 167/72 RR: 16  SpO2: 98% .   CT HEAD/ cervical spine : No acute intracranial findings. Small right parietal scalp hematoma without underlying calvarial fracture. Mild chronic microvascular ischemic changes. No acute fracture or dislocation.  Trauma w/up showed Right subcapital displaced impacted fracture in varus angulation.   (2021 17:07)      PAST MEDICAL & SURGICAL HISTORY:  Hyperlipemia    HTN (hypertension)    H/O breast implant        Hospital Course: post doni arthoplasty tolerated the procedure well , seen by PT /OT    TODAY'S SUBJECTIVE & REVIEW OF SYMPTOMS: no CP, no SOB, no B/B  see HPI other ROS unchanged         MEDICATIONS  (STANDING):  amLODIPine   Tablet 5 milliGRAM(s) Oral daily  ATENolol  Tablet 50 milliGRAM(s) Oral daily  heparin   Injectable 5000 Unit(s) SubCutaneous every 12 hours  simvastatin 20 milliGRAM(s) Oral at bedtime    MEDICATIONS  (PRN):  morphine  - Injectable 2 milliGRAM(s) IV Push every 4 hours PRN Moderate Pain (4 - 6)  senna 2 Tablet(s) Oral at bedtime PRN Constipation      FAMILY HISTORY:      Allergies    No Known Allergies    Intolerances        SOCIAL HISTORY:    [  ] Etoh  [  ] Smoking  [  ] Substance abuse     Home Environment:  [  ] Home Alone  [ x ] Lives with Family  [  ] Home Health Aid    Dwelling:  [  ] Apartment  [ x ] Private House  [  ] Adult Home  [  ] Skilled Nursing Facility      [  ] Short Term  [  ] Long Term  [x  ] Stairs       Elevator [  ]    FUNCTIONAL STATUS PTA: (Check all that apply)  Ambulation: [x   ]Independent   [   ] Requires Assistance   [  ] Dependent     [  ] Non-Ambulatory       Assistive Device: [  ] SA Cane  [  ]  Q Cane  [  ] Walker  [  ]  Wheelchair  ADL : [ x ] Independent    [   ] Requires Assistance    [  ]  Dependent       Vital Signs Last 24 Hrs  T(C): 36.9 (2021 14:21), Max: 36.9 (2021 14:21)  T(F): 98.4 (2021 14:21), Max: 98.4 (2021 14:21)  HR: 92 (2021 14:21) (63 - 92)  BP: 127/59 (2021 14:21) (93/51 - 127/59)  BP(mean): 74 (10 Feb 2021 18:55) (74 - 81)  RR: 18 (2021 14:21) (14 - 18)  SpO2: 95% (2021 07:40) (95% - 99%)      PHYSICAL EXAM: Alert & Oriented X3, OOB to chair today  GENERAL: NAD, well-groomed, well-developed  HEAD:  Atraumatic, Normocephalic  EYES: EOMI, PERRL  NECK: Supple  CHEST/LUNG: Clear   HEART: Regular   ABDOMEN: Soft, Nontender,   EXTREMITIES:  No clubbing, cyanosis, slight  edema on RT hip area, dressing in place RT hip  ROM:  [  x ] WFL all extremities= decreased on RT hip  [  ] Abnormal    NERVOUS SYSTEM:   Cranial Nerves 2-12: [  x ] intact  [  ] Abnormal   Motor Strength: [ x  ] WFL all extremities = except RT hip  [  ] Abnormal        FUNCTIONAL STATUS: see PT  Bed Mobility: [   ]Independent  [ x ] Supervision  [  ] Needs Assistance   [  ] N/A   Transfers: [   ] Independent  [  ] Supervision  [x  ] Needs Assistance  [  ]  N/A   Ambulation: [   ] Independent  [  ] Supervision  [ x ] Needs Assistance = 40 ft x2 [  ] N/A   ADL: [ x  ] Independent  with set up [  ] Requires Assistance  [  ] N/A       LABS:                        12.7   14.12 )-----------( 253      ( 2021 12:05 )             36.3     02-11    138  |  98  |  12  ----------------------------<  85  3.8   |  27  |  1.1    Ca    9.3      2021 07:52  Mg     2.1         TPro  5.7<L>  /  Alb  3.9  /  TBili  0.5  /  DBili  x   /  AST  46<H>  /  ALT  29  /  AlkPhos  72        Urinalysis Basic - ( 2021 17:46 )    Color: Light Yellow / Appearance: Clear / S.022 / pH: x  Gluc: x / Ketone: Large  / Bili: Negative / Urobili: <2 mg/dL   Blood: x / Protein: 30 mg/dL / Nitrite: Negative   Leuk Esterase: Negative / RBC: 2 /HPF / WBC 1 /HPF   Sq Epi: x / Non Sq Epi: 1 /HPF / Bacteria: Negative        RADIOLOGY & ADDITIONAL STUDIES:

## 2021-02-11 NOTE — OCCUPATIONAL THERAPY INITIAL EVALUATION ADULT - NS ASR TOILETING EQUIP NEEDS
to be determined. Pt owns wall grab bar near toilet. Will be determined prior to d/c if needs to be downgraded to commode.

## 2021-02-11 NOTE — DIETITIAN INITIAL EVALUATION ADULT. - OTHER CALCULATIONS
Using ABW 57.6kg; Energy: 1312-1530kcal (MSJ 1.2-1.4 AF - considering wt loss + muscle wasting); protein: 58-75g/kg (1-1.3g/kg -- same reason as before + age considered); Fluid: 1mL/kcal or LIP

## 2021-02-11 NOTE — OCCUPATIONAL THERAPY INITIAL EVALUATION ADULT - IMPAIRED TRANSFERS: BED/CHAIR, REHAB EVAL
c/o pain  R hip 5/10 VAS. See details above./impaired balance/decreased flexibility/narrow base of support/pain/decreased ROM/scissoring/decreased strength

## 2021-02-11 NOTE — PROGRESS NOTE ADULT - SUBJECTIVE AND OBJECTIVE BOX
NADINE BENEDICT 76y Female  MRN#: [id number]   CODE STATUS: Full       SUBJECTIVE  Patient is a 76y old Female who presents with a chief complaint of right hip fracture (2021 06:51)  Currently admitted to medicine with the primary diagnosis of Closed fracture of neck of right femur, initial encounter    Today is hospital day 2d, and this morning she is resting comfortably in bed with no pain. Her only complaint was that she was hungry.   No overnight events.         OBJECTIVE  PAST MEDICAL & SURGICAL HISTORY  Hyperlipemia    HTN (hypertension)    H/O breast implant      ALLERGIES:  No Known Allergies    MEDICATIONS:  STANDING MEDICATIONS  amLODIPine   Tablet 5 milliGRAM(s) Oral daily  ATENolol  Tablet 50 milliGRAM(s) Oral daily  ceFAZolin   IVPB 2000 milliGRAM(s) IV Intermittent every 8 hours  heparin   Injectable 5000 Unit(s) SubCutaneous every 12 hours  simvastatin 20 milliGRAM(s) Oral at bedtime    PRN MEDICATIONS  morphine  - Injectable 2 milliGRAM(s) IV Push every 4 hours PRN  senna 2 Tablet(s) Oral at bedtime PRN      VITAL SIGNS: Last 24 Hours  T(C): 35.9 (2021 04:56), Max: 37.1 (10 Feb 2021 15:42)  T(F): 96.7 (2021 04:56), Max: 98.7 (10 Feb 2021 15:42)  HR: 71 (2021 04:56) (63 - 82)  BP: 124/63 (2021 04:56) (93/51 - 139/64)  BP(mean): 74 (10 Feb 2021 18:55) (74 - 81)  RR: 18 (2021 04:56) (14 - 20)  SpO2: 95% (2021 07:40) (95% - 99%)    LABS:                        12.9   12.34 )-----------( 219      ( 2021 07:52 )             36.8     -11    138  |  98  |  12  ----------------------------<  85  3.8   |  27  |  1.1    Ca    9.3      2021 07:52  Mg     2.1     11    TPro  5.7<L>  /  Alb  3.9  /  TBili  0.5  /  DBili  x   /  AST  46<H>  /  ALT  29  /  AlkPhos  72  02-    PT/INR - ( 2021 14:20 )   PT: 12.10 sec;   INR: 1.05 ratio         PTT - ( 2021 14:20 )  PTT:27.6 sec  Urinalysis Basic - ( 2021 17:46 )    Color: Light Yellow / Appearance: Clear / S.022 / pH: x  Gluc: x / Ketone: Large  / Bili: Negative / Urobili: <2 mg/dL   Blood: x / Protein: 30 mg/dL / Nitrite: Negative   Leuk Esterase: Negative / RBC: 2 /HPF / WBC 1 /HPF   Sq Epi: x / Non Sq Epi: 1 /HPF / Bacteria: Negative            CARDIAC MARKERS ( 2021 14:20 )  x     / x     / 186 U/L / x     / x          RADIOLOGY:      PHYSICAL EXAM:  GENERAL: NAD, frail appearing, AAOx3  HEENT:  Atraumatic, Normocephalic. EOMI, conjunctiva and sclera clear, No JVD  PULMONARY: Clear to auscultation bilaterally; No wheeze  CARDIOVASCULAR: Regular rate and rhythm; No murmurs, rubs, or gallops  GASTROINTESTINAL: Soft, Nontender, Nondistended; Bowel sounds present  MUSCULOSKELETAL: No clubbing, cyanosis, or edema  NEUROLOGY: non-focal  SKIN: No rashes or lesions      ASSESSMENT & PLAN  77 yo f PMHx oh hypertension presented with right hip fracture.    # Right subcapital displaced impacted fracture 2/2 mechanical fall, no LOC   - CT head: no intracranial pathology   - X-ray Pelvis Right subcapital displaced impacted fracture in varus angulation.   - ortho consult  OR 2/10 R hip hemiarthroplasty   - Post op abx (ancef) for 24 hrs   - pain control: morphine 2mgh IV q 4 hrs PRN  - PT eval: rolling walker, home with home PT     # DLD:  - Lipid Profile: Chol 191, TG 67, HDL 79,    - c/w simvastatin 20  qd     # HTN  - c/w home meds    #Misc  - DVT Prophylaxis: heparin SQ  - GI Prophylaxis: not needed  - Diet: DASH  - Activity: WBAT  - Code Status: Full Code

## 2021-02-11 NOTE — OCCUPATIONAL THERAPY INITIAL EVALUATION ADULT - GENERAL OBSERVATIONS, REHAB EVAL
Pt received semifowler in bed. + Primafit, + IV lock. Pt left seated in bedside chair. + Primafit, + IV lock

## 2021-02-11 NOTE — OCCUPATIONAL THERAPY INITIAL EVALUATION ADULT - ADDITIONAL COMMENTS
As per pt report, resides in private house with mandatory steps to enter. Resides on first floor; laundry room in basement. Still drives. Does not drive in bad weather ( snow on road).

## 2021-02-11 NOTE — OCCUPATIONAL THERAPY INITIAL EVALUATION ADULT - BALANCE TRAINING, PT EVAL
In one week, pt will demonstrate  increase in s/d standing balance  by 1/2 grade, enabling increased independence in ADL/transfers.

## 2021-02-11 NOTE — OCCUPATIONAL THERAPY INITIAL EVALUATION ADULT - TRANSFER TRAINING, PT EVAL
In one week, pt will demonstrate sit<>stand with min assist with use of appropriate adaptive device. In one week, pt will demonstrate contact guard for bed<>chair transfer, excluding sit<>stand, with appropriate adaptive device.

## 2021-02-11 NOTE — DIETITIAN INITIAL EVALUATION ADULT. - MALNUTRITION
reason for wt loss/wasting unknown at this time -- unable to dx pt w/ PCM d/t unknown etiology reason for wt loss/muscle wasting unknown at this time -- unable to dx pt w/ PCM d/t unknown etiology

## 2021-02-11 NOTE — OCCUPATIONAL THERAPY INITIAL EVALUATION ADULT - STRENGTHENING, PT EVAL
In one week, pt will demonstrate increase in B/L UE strength by 1/2 grade, enabling increased independence in ADL/transfers.

## 2021-02-11 NOTE — OCCUPATIONAL THERAPY INITIAL EVALUATION ADULT - IMPAIRED TRANSFERS: SIT/STAND, REHAB EVAL
c/o pain  R hip 5/10 VAS. See details above/impaired balance/decreased flexibility/narrow base of support/pain/decreased ROM/decreased strength

## 2021-02-11 NOTE — OCCUPATIONAL THERAPY INITIAL EVALUATION ADULT - PERTINENT HX OF CURRENT PROBLEM, REHAB EVAL
Patient was getting ready to get her colonoscopy today when she tripped with the rug and fell and hit her head and her right side . Trauma w/up showed Right subcapital displaced impacted fracture in varus angulation.

## 2021-02-11 NOTE — CONSULT NOTE ADULT - ASSESSMENT
Orthopaedic Surgery Consult Note      75yo Female w/ PMHx oh hypertension presented with right hip fracture.    Patient was getting ready to get her colonoscopy today when she tripped with the rug and fell and hit her head and her right side .she denies any LOC ,palpitations, dizziness, blurry vision, seizure like activity.    Also denies any chest pain, cough, sob, N/V/D, dysuria, no other complaints.     Of note: patient had been loosing weight 25 lbs for the last 1 year and was supposed to get colonoscopy for that reason denies any dark/ bloody BM.  Constipation noted.  patient's grandson had a wild cat inside the house for the last 1 year, fond out that it had "worms" which's she thinks the cause of her weight loss. She went for testing but never got the results.     In the ED Vital Signs were WNL except for BP T(C): 36.4  HR: 69 BP: 167/72 RR: 16  SpO2: 98% .   CT HEAD/ cervical spine: No acute intracranial findings. Small right parietal scalp hematoma without underlying calvarial fracture. Mild chronic microvascular ischemic changes. No acute fracture or dislocation. Trauma w/up showed Right subcapital displaced impacted fracture in varus angulation. ortho consulted for eval and management.         Medications  amLODIPine   Tablet 5 milliGRAM(s) Oral daily  ATENolol  Tablet 50 milliGRAM(s) Oral daily  heparin   Injectable 5000 Unit(s) SubCutaneous every 12 hours  morphine  - Injectable 2 milliGRAM(s) IV Push every 4 hours PRN  senna 2 Tablet(s) Oral at bedtime PRN  simvastatin 20 milliGRAM(s) Oral at bedtime      Home Medications:  amLODIPine 5 mg oral tablet: 1 tab(s) orally once a day (09 Feb 2021 18:15)  atenolol-chlorthalidone 50 mg-25 mg oral tablet: 1 tab(s) orally once a day (09 Feb 2021 18:15)  simvastatin 20 mg oral tablet: 1 tab(s) orally once a day (at bedtime) (09 Feb 2021 18:15)      Allergies  No Known Allergies        T(C): 37 (02-09-21 @ 19:09), Max: 37 (02-09-21 @ 19:09)  HR: 80 (02-09-21 @ 19:09) (69 - 80)  BP: 139/64 (02-09-21 @ 19:09) (139/64 - 167/72)  RR: 16 (02-09-21 @ 11:57) (16 - 16)  SpO2: 97% (02-09-21 @ 19:09) (97% - 98%)    P/E:  AOx3, NAD  Non-labored breathing    General: In no acute distress, awake/alert and oriented    Right Hip   Unable to range hip secondary to pain  Pain with axial loading and log roll  No swelling or ecchymosis  Skin intact about the hip  Actively flexing and extending knee with minimal pain  Motor intact: 5/5 gastroc-soleus, tibialis anterior, FHL, EHL  Sensation intact to light touch sural/saphenous/DP/SP/tibial   DP/PT palpable pulses, toes warm and well perfused    Complete orthopaedic secondary exam unremarkable for other injuries        Labs                        13.9   11.35 )-----------( 225      ( 09 Feb 2021 14:20 )             39.6     02-09    137  |  95<L>  |  27<H>  ----------------------------<  87  3.5   |  23  |  1.1    Ca    9.7      09 Feb 2021 14:20    TPro  6.5  /  Alb  4.2  /  TBili  0.6  /  DBili  x   /  AST  68<H>  /  ALT  43<H>  /  AlkPhos  71  02-09    LIVER FUNCTIONS - ( 09 Feb 2021 14:20 )  Alb: 4.2 g/dL / Pro: 6.5 g/dL / ALK PHOS: 71 U/L / ALT: 43 U/L / AST: 68 U/L / GGT: x           PT/INR - ( 09 Feb 2021 14:20 )   PT: 12.10 sec;   INR: 1.05 ratio    PTT - ( 09 Feb 2021 14:20 )  PTT:27.6 sec    Imaging:  Multiple XR views today of pelvis, R hip and femur demonstrate   - Right displaced femoral neck fracture       A/P:  75yo Female with Right displaced femoral neck fracture.     Discussed with patient the nature and severity of the injury, and the need and rationale for operative intervention for best chance return to function. We discussed that many patients never return to function and significant medical complications are relatively common with this injury. All questions answered.     Proposed procedure: R hip hemiarthroplasty vs. total hip arthroplasty    Weight bearing: NWB RLE  Pain control  Ice/elevation    Admit  Diet: NPO  DVT ppx: SCD, hold chem dvt ppx  Abx: none  Pain control  Home medications: reinstate as appropriate   Repeat labs in AM  PT/OT/Rehab consult      
IMPRESSION: Rehab of gait disordr due to RT hip fx hemiarthroplasty     PRECAUTIONS: [    ] Cardiac  [    ] Respiratory  [    ] Seizures [    ] Contact Isolation  [    ] Droplet Isolation  [    ] Other    Weight Bearing Status: wbat    RECOMMENDATION:    Out of Bed to Chair     DVT/Decubiti Prophylaxis    REHAB PLAN:     [   x  ] Bedside P/T 3-5 times a week   [   x  ] Bedside O/T  2-3 times a week   [     ] No Rehab Therapy Indicated   [     ]  Speech Therapy   Conditioning/ROM                                 ADL  Bed Mobility                                            Conditioning/ROM  Transfers                                                  Bed Mobility  Sitting /Standing Balance                      Transfers                                        Gait Training                                            Sitting/Standing Balance  Stair Training [   ]Applicable                 Home equipment Eval                                                                     Splinting  [   ] Only      GOALS:   ADL   [    ]   Independent         Transfers  [    ] Independent            Ambulation  [     ] Independent     [     ] With device                            [    ]  CG                                               [    ]  CG                                                    [     ] CG                            [    ] Min A                                          [    ] Min A                                                [     ] Min  A          DISCHARGE PLAN:   [     ]  Good candidate for Intensive Rehabilitation/Hospital based-4A [facility]                                             Will tolerate 3hrs Intensive Rehab Daily                                       [  x    ]  Short Term Rehab in Skilled Nursing Facility  [facility]                                     [    x  ]  Home with Outpatient or VN services                                         [      ]  Possible Candidate for Intensive Hospital based Rehab

## 2021-02-11 NOTE — OCCUPATIONAL THERAPY INITIAL EVALUATION ADULT - TRANSFER SAFETY CONCERNS NOTED: BED/CHAIR, REHAB EVAL
decreased balance during turns/losing balance/stand pivot/decreased step length/decreased weight-shifting ability

## 2021-02-12 ENCOUNTER — TRANSCRIPTION ENCOUNTER (OUTPATIENT)
Age: 76
End: 2021-02-12

## 2021-02-12 LAB
ALBUMIN SERPL ELPH-MCNC: 3.6 G/DL — SIGNIFICANT CHANGE UP (ref 3.5–5.2)
ALP SERPL-CCNC: 67 U/L — SIGNIFICANT CHANGE UP (ref 30–115)
ALT FLD-CCNC: 16 U/L — SIGNIFICANT CHANGE UP (ref 0–41)
ANION GAP SERPL CALC-SCNC: 13 MMOL/L — SIGNIFICANT CHANGE UP (ref 7–14)
AST SERPL-CCNC: 50 U/L — HIGH (ref 0–41)
BASOPHILS # BLD AUTO: 0.02 K/UL — SIGNIFICANT CHANGE UP (ref 0–0.2)
BASOPHILS NFR BLD AUTO: 0.2 % — SIGNIFICANT CHANGE UP (ref 0–1)
BILIRUB SERPL-MCNC: 0.8 MG/DL — SIGNIFICANT CHANGE UP (ref 0.2–1.2)
BUN SERPL-MCNC: 14 MG/DL — SIGNIFICANT CHANGE UP (ref 10–20)
CALCIUM SERPL-MCNC: 8.8 MG/DL — SIGNIFICANT CHANGE UP (ref 8.5–10.1)
CHLORIDE SERPL-SCNC: 97 MMOL/L — LOW (ref 98–110)
CO2 SERPL-SCNC: 29 MMOL/L — SIGNIFICANT CHANGE UP (ref 17–32)
CREAT SERPL-MCNC: 0.8 MG/DL — SIGNIFICANT CHANGE UP (ref 0.7–1.5)
EOSINOPHIL # BLD AUTO: 0.12 K/UL — SIGNIFICANT CHANGE UP (ref 0–0.7)
EOSINOPHIL NFR BLD AUTO: 1.3 % — SIGNIFICANT CHANGE UP (ref 0–8)
GLUCOSE SERPL-MCNC: 100 MG/DL — HIGH (ref 70–99)
HCT VFR BLD CALC: 35.9 % — LOW (ref 37–47)
HGB BLD-MCNC: 12.4 G/DL — SIGNIFICANT CHANGE UP (ref 12–16)
IMM GRANULOCYTES NFR BLD AUTO: 0.3 % — SIGNIFICANT CHANGE UP (ref 0.1–0.3)
LYMPHOCYTES # BLD AUTO: 1.21 K/UL — SIGNIFICANT CHANGE UP (ref 1.2–3.4)
LYMPHOCYTES # BLD AUTO: 12.8 % — LOW (ref 20.5–51.1)
MAGNESIUM SERPL-MCNC: 1.5 MG/DL — LOW (ref 1.8–2.4)
MCHC RBC-ENTMCNC: 30.1 PG — SIGNIFICANT CHANGE UP (ref 27–31)
MCHC RBC-ENTMCNC: 34.5 G/DL — SIGNIFICANT CHANGE UP (ref 32–37)
MCV RBC AUTO: 87.1 FL — SIGNIFICANT CHANGE UP (ref 81–99)
MONOCYTES # BLD AUTO: 0.83 K/UL — HIGH (ref 0.1–0.6)
MONOCYTES NFR BLD AUTO: 8.8 % — SIGNIFICANT CHANGE UP (ref 1.7–9.3)
NEUTROPHILS # BLD AUTO: 7.27 K/UL — HIGH (ref 1.4–6.5)
NEUTROPHILS NFR BLD AUTO: 76.6 % — HIGH (ref 42.2–75.2)
NRBC # BLD: 0 /100 WBCS — SIGNIFICANT CHANGE UP (ref 0–0)
PLATELET # BLD AUTO: 188 K/UL — SIGNIFICANT CHANGE UP (ref 130–400)
POTASSIUM SERPL-MCNC: 2.8 MMOL/L — LOW (ref 3.5–5)
POTASSIUM SERPL-SCNC: 2.8 MMOL/L — LOW (ref 3.5–5)
PROT SERPL-MCNC: 5.2 G/DL — LOW (ref 6–8)
RBC # BLD: 4.12 M/UL — LOW (ref 4.2–5.4)
RBC # FLD: 12.1 % — SIGNIFICANT CHANGE UP (ref 11.5–14.5)
SODIUM SERPL-SCNC: 139 MMOL/L — SIGNIFICANT CHANGE UP (ref 135–146)
TSH SERPL-MCNC: 0.6 UIU/ML — SIGNIFICANT CHANGE UP (ref 0.27–4.2)
WBC # BLD: 9.48 K/UL — SIGNIFICANT CHANGE UP (ref 4.8–10.8)
WBC # FLD AUTO: 9.48 K/UL — SIGNIFICANT CHANGE UP (ref 4.8–10.8)

## 2021-02-12 PROCEDURE — 99232 SBSQ HOSP IP/OBS MODERATE 35: CPT

## 2021-02-12 RX ORDER — ATENOLOL 25 MG/1
1 TABLET ORAL
Qty: 0 | Refills: 0 | DISCHARGE
Start: 2021-02-12

## 2021-02-12 RX ORDER — POTASSIUM CHLORIDE 20 MEQ
20 PACKET (EA) ORAL
Refills: 0 | Status: COMPLETED | OUTPATIENT
Start: 2021-02-12 | End: 2021-02-12

## 2021-02-12 RX ORDER — OXYCODONE AND ACETAMINOPHEN 5; 325 MG/1; MG/1
1 TABLET ORAL EVERY 6 HOURS
Refills: 0 | Status: DISCONTINUED | OUTPATIENT
Start: 2021-02-12 | End: 2021-02-13

## 2021-02-12 RX ORDER — POLYETHYLENE GLYCOL 3350 17 G/17G
17 POWDER, FOR SOLUTION ORAL
Qty: 0 | Refills: 0 | DISCHARGE
Start: 2021-02-12

## 2021-02-12 RX ORDER — POLYETHYLENE GLYCOL 3350 17 G/17G
17 POWDER, FOR SOLUTION ORAL EVERY 12 HOURS
Refills: 0 | Status: DISCONTINUED | OUTPATIENT
Start: 2021-02-12 | End: 2021-02-13

## 2021-02-12 RX ORDER — MAGNESIUM HYDROXIDE 400 MG/1
30 TABLET, CHEWABLE ORAL EVERY 6 HOURS
Refills: 0 | Status: DISCONTINUED | OUTPATIENT
Start: 2021-02-12 | End: 2021-02-13

## 2021-02-12 RX ORDER — SENNA PLUS 8.6 MG/1
2 TABLET ORAL
Qty: 0 | Refills: 0 | DISCHARGE
Start: 2021-02-12

## 2021-02-12 RX ORDER — POTASSIUM CHLORIDE 20 MEQ
40 PACKET (EA) ORAL ONCE
Refills: 0 | Status: COMPLETED | OUTPATIENT
Start: 2021-02-12 | End: 2021-02-13

## 2021-02-12 RX ORDER — MAGNESIUM SULFATE 500 MG/ML
2 VIAL (ML) INJECTION ONCE
Refills: 0 | Status: COMPLETED | OUTPATIENT
Start: 2021-02-12 | End: 2021-02-12

## 2021-02-12 RX ADMIN — Medication 50 MILLIEQUIVALENT(S): at 18:06

## 2021-02-12 RX ADMIN — ATENOLOL 50 MILLIGRAM(S): 25 TABLET ORAL at 05:44

## 2021-02-12 RX ADMIN — OXYCODONE AND ACETAMINOPHEN 1 TABLET(S): 5; 325 TABLET ORAL at 16:59

## 2021-02-12 RX ADMIN — Medication 25 GRAM(S): at 12:46

## 2021-02-12 RX ADMIN — HEPARIN SODIUM 5000 UNIT(S): 5000 INJECTION INTRAVENOUS; SUBCUTANEOUS at 16:05

## 2021-02-12 RX ADMIN — SIMVASTATIN 20 MILLIGRAM(S): 20 TABLET, FILM COATED ORAL at 20:58

## 2021-02-12 RX ADMIN — Medication 50 MILLIEQUIVALENT(S): at 10:42

## 2021-02-12 RX ADMIN — POLYETHYLENE GLYCOL 3350 17 GRAM(S): 17 POWDER, FOR SOLUTION ORAL at 16:05

## 2021-02-12 RX ADMIN — Medication 50 MILLIEQUIVALENT(S): at 14:34

## 2021-02-12 RX ADMIN — AMLODIPINE BESYLATE 5 MILLIGRAM(S): 2.5 TABLET ORAL at 05:44

## 2021-02-12 RX ADMIN — HEPARIN SODIUM 5000 UNIT(S): 5000 INJECTION INTRAVENOUS; SUBCUTANEOUS at 05:21

## 2021-02-12 NOTE — PROGRESS NOTE ADULT - SUBJECTIVE AND OBJECTIVE BOX
Patient seen and examined at bedside in the am. No acute events overnight. Pain well controlled. No complaints at this time.     MEDICATIONS  (STANDING):  amLODIPine   Tablet 5 milliGRAM(s) Oral daily  ceFAZolin   IVPB 2000 milliGRAM(s) IV Intermittent every 8 hours  heparin   Injectable 5000 Unit(s) SubCutaneous every 12 hours    MEDICATIONS  (PRN):  morphine  - Injectable 2 milliGRAM(s) IV Push every 4 hours PRN Moderate Pain (4 - 6)  senna 2 Tablet(s) Oral at bedtime PRN Constipation    Physical exam  ICU Vital Signs Last 24 Hrs  T(C): 36 (12 Feb 2021 04:58), Max: 36.9 (11 Feb 2021 14:21)  T(F): 96.8 (12 Feb 2021 04:58), Max: 98.4 (11 Feb 2021 14:21)  HR: 70 (12 Feb 2021 05:40) (70 - 94)  BP: 119/65 (12 Feb 2021 05:40) (102/52 - 127/59)  BP(mean): --  ABP: --  ABP(mean): --  RR: 18 (12 Feb 2021 05:40) (18 - 18)  SpO2: 95% (11 Feb 2021 07:40) (95% - 95%)    NAD, AOx3  Non-labored breathing   Right hip   Dressing C/D/I  EHL/FHL/GA/TA Motor intact  SP/DP/T/S/S SILT distally  Toes WWP                          13.3   12.98 )-----------( 192      ( 10 Feb 2021 19:58 )             38.8       Assessment/plan  76F POD#2 s/p R hip doni arthroplasty, doing well    Pain control  DVT ppx  WBAT  OOBTC/IS  PT/OT  Continue Home meds  AM labs

## 2021-02-12 NOTE — DISCHARGE NOTE PROVIDER - NSDCFUADDAPPT_GEN_ALL_CORE_FT
Follow up with Orthopedics in 1 week   Follow up with PMD in 1-2 weeks for blood work to monitor your electrolytes. Follow up with Orthopedics in 1 week   Follow up with PMD in 1-2 weeks for blood work to monitor your electrolytes and kidney function.

## 2021-02-12 NOTE — PROGRESS NOTE ADULT - ATTENDING COMMENTS
agree w above  wbat  rehab
Agree with resident's note, HPI, PE, assessment and plan.  Pt was seen and examined independently.     Pt does not have complaints. She tolerated surgery well. Denies pain.     Vitals reviewed, afebrile, normotensive.     Physical exam:  NAD, looks comfortable   RESP: CTA b/l, no crackles, rhonchi  CVS: S1S2, RRR  GI: abdomen soft NT, ND  Extremities: Right hip post op site clean dressing, compartments soft, no calf tenderness b/l  NEURO: AOx3, no focal deficit    labs reviewed.  WBC 12.34  Hb 12.9  cr 1.1    A/P: 75 yo F PMHx of hypertension presented with right hip fracture after mechanical fall.    # Right subcapital displaced impacted hip fracture after mechanical fall, s/p hemiarthroplasty on 02/10.   - WBAT, out of bed to chair, PT, Rehab eval  - incentive spirometry  - pain control with Tylenol 650 mg Q 8 hrs PRN  - physiatrist evaluation recommendation    # Leucocytosis - likely reactive, no signs of acute infection, no fever, no symptoms, monitor for now    # HTN - chronic, cont home meds    # Weight loss - pt has started OP work up including mammogram, colonoscopy.    DC planning.
Patient is a 77 yo f PMHx of hypertension who presents after a mechanical fall at home, when getting ready to go for a Colonoscopy. CT Head unremarkable, but Xrays revealing for R subcapital displaced fracture. Orthopedic surgery consulted and pt is now s/p R Hip Hemiarthroplasty on 2/10.     #R Femoral Neck Fracture s/p Hemiarthroplasty of R Hip- Orthopedic surgery following. PT reccomends STR. COVID swab today. Percocet for pain control.     #Hypokalemia/Hypomagnesemia- will replete, and follow up repeat levels.     #Leukocytosis- likely reactive s/p surgery. Now resolved. Afebrile.     # DLD- continue simvastatin     # HTN- continue atenolol and amlodipine     # Weight loss - pt has started OP work up including mammogram, colonoscopy ( will need to reschedule this )     DVT ppx: heparin sq     Dispo: STR    I have reviewed the above resident documentation, my edits included in this addendum.       Sandra Witt DO

## 2021-02-12 NOTE — DISCHARGE NOTE PROVIDER - NSDCMRMEDTOKEN_GEN_ALL_CORE_FT
amLODIPine 5 mg oral tablet: 1 tab(s) orally once a day  atenolol 50 mg oral tablet: 1 tab(s) orally once a day  atenolol-chlorthalidone 50 mg-25 mg oral tablet: 1 tab(s) orally once a day  polyethylene glycol 3350 oral powder for reconstitution: 17 gram(s) orally every 12 hours  senna oral tablet: 2 tab(s) orally once a day (at bedtime), As needed, Constipation  simvastatin 20 mg oral tablet: 1 tab(s) orally once a day (at bedtime)   amLODIPine 5 mg oral tablet: 1 tab(s) orally once a day  atenolol 50 mg oral tablet: 1 tab(s) orally once a day  lisinopril 10 mg oral tablet: 1 tab(s) orally once a day   naproxen 500 mg oral tablet: 1 tab(s) orally 2 times a day   polyethylene glycol 3350 oral powder for reconstitution: 17 gram(s) orally every 12 hours  senna oral tablet: 2 tab(s) orally once a day (at bedtime), As needed, Constipation  simvastatin 20 mg oral tablet: 1 tab(s) orally once a day (at bedtime)  Tylenol Extra Strength 500 mg oral tablet: 1 tab(s) orally every 6 hours    amLODIPine 5 mg oral tablet: 1 tab(s) orally once a day  lisinopril 10 mg oral tablet: 1 tab(s) orally once a day   naproxen 500 mg oral tablet: 1 tab(s) orally 2 times a day   polyethylene glycol 3350 oral powder for reconstitution: 17 gram(s) orally every 12 hours  senna oral tablet: 2 tab(s) orally once a day (at bedtime), As needed, Constipation  simvastatin 20 mg oral tablet: 1 tab(s) orally once a day (at bedtime)  Tylenol Extra Strength 500 mg oral tablet: 1 tab(s) orally every 6 hours

## 2021-02-12 NOTE — DISCHARGE NOTE PROVIDER - NSDCCPCAREPLAN_GEN_ALL_CORE_FT
PRINCIPAL DISCHARGE DIAGNOSIS  Diagnosis: Closed fracture of neck of right femur, initial encounter  Assessment and Plan of Treatment: You had imaging done which showed your broke your right hip. You had surgery with ortho on 2/10. Please continue to work with physical therapy to regain your strength.

## 2021-02-12 NOTE — DISCHARGE NOTE NURSING/CASE MANAGEMENT/SOCIAL WORK - NSDCFUADDAPPT_GEN_ALL_CORE_FT
Follow up with Orthopedics in 1 week   Follow up with PMD in 1-2 weeks for blood work to monitor your electrolytes and kidney function.

## 2021-02-12 NOTE — DISCHARGE NOTE NURSING/CASE MANAGEMENT/SOCIAL WORK - PATIENT PORTAL LINK FT
You can access the FollowMyHealth Patient Portal offered by [facility] by registering at the following website: [email or web address]. By joining [email or web address] FollowMyHealth portal, you will also be able to view your health information using other applications (apps) compatible with our system.

## 2021-02-12 NOTE — DISCHARGE NOTE PROVIDER - CARE PROVIDER_API CALL
Andre Calix)  Orthopaedic Surgery  [address] [place] [address]  Phone: [phone]  Fax: [phone]  Follow Up Time:    Andre Calix)  Orthopaedic Surgery  [facility] [place] [address]  Phone: [phone]  Fax: [phone]  Follow Up Time:     Ramon Montalvo (DO)  Medicine  Physicians  [address] [place] [address]  Phone: [phone]  Fax: [phone]  Follow Up Time:

## 2021-02-12 NOTE — PROGRESS NOTE ADULT - SUBJECTIVE AND OBJECTIVE BOX
NADINE BENEDICT 76y Female  MRN#: [id number]   CODE STATUS: Full       SUBJECTIVE  Patient is a 76y old Female who presents with a chief complaint of right hip fracture (12 Feb 2021 06:04)  Currently admitted to medicine with the primary diagnosis of Closed fracture of neck of right femur, initial encounter    Today is hospital day 3d, and this morning she is resting comfortably in bed. She was complaining of a little soreness in her hip,   No overnight events.         OBJECTIVE  PAST MEDICAL & SURGICAL HISTORY  Hyperlipemia    HTN (hypertension)    H/O breast implant      ALLERGIES:  No Known Allergies    MEDICATIONS:  STANDING MEDICATIONS  amLODIPine   Tablet 5 milliGRAM(s) Oral daily  ATENolol  Tablet 50 milliGRAM(s) Oral daily  heparin   Injectable 5000 Unit(s) SubCutaneous every 12 hours  polyethylene glycol 3350 17 Gram(s) Oral every 12 hours  potassium chloride    Tablet ER 40 milliEquivalent(s) Oral once  potassium chloride  20 mEq/100 mL IVPB 20 milliEquivalent(s) IV Intermittent every 2 hours  simvastatin 20 milliGRAM(s) Oral at bedtime    PRN MEDICATIONS  magnesium hydroxide Suspension 30 milliLiter(s) Oral every 6 hours PRN  senna 2 Tablet(s) Oral at bedtime PRN      VITAL SIGNS: Last 24 Hours  T(C): 36 (12 Feb 2021 04:58), Max: 36.9 (11 Feb 2021 14:21)  T(F): 96.8 (12 Feb 2021 04:58), Max: 98.4 (11 Feb 2021 14:21)  HR: 70 (12 Feb 2021 05:40) (70 - 94)  BP: 119/65 (12 Feb 2021 05:40) (102/52 - 127/59)  BP(mean): --  RR: 18 (12 Feb 2021 05:40) (18 - 18)  SpO2: --    LABS:                        12.4   9.48  )-----------( 188      ( 12 Feb 2021 06:47 )             35.9     02-12    139  |  97<L>  |  14  ----------------------------<  100<H>  2.8<L>   |  29  |  0.8    Ca    8.8      12 Feb 2021 06:47  Mg     1.5     02-12    TPro  5.2<L>  /  Alb  3.6  /  TBili  0.8  /  DBili  x   /  AST  50<H>  /  ALT  16  /  AlkPhos  67  02-12        PHYSICAL EXAM:  GENERAL: NAD, frail appearing, AAOx3  HEENT:  Atraumatic, Normocephalic. EOMI, conjunctiva and sclera clear, No JVD  PULMONARY: Clear to auscultation bilaterally; No wheeze  CARDIOVASCULAR: Regular rate and rhythm; No murmurs, rubs, or gallops  GASTROINTESTINAL: Soft, Nontender, Nondistended; Bowel sounds present  MUSCULOSKELETAL: No clubbing, cyanosis, or edema  NEUROLOGY: non-focal  SKIN: No rashes or lesions      ASSESSMENT & PLAN  77 yo f PMHx oh hypertension presented with right hip fracture.    # Right subcapital displaced impacted fracture 2/2 mechanical fall, no LOC   - CT head: no intracranial pathology   - X-ray Pelvis Right subcapital displaced impacted fracture in varus angulation.   - ortho consult  OR 2/10 R hip hemiarthroplasty   - Post op abx (ancef) for 24 hrs   - pain control: morphine 2mgh IV q 4 hrs PRN  - PT eval: rolling walker, home with home PT     # DLD:  - Lipid Profile: Chol 191, TG 67, HDL 79,    - c/w simvastatin 20  qd     # HTN  - c/w home meds    #Misc  - DVT Prophylaxis: heparin SQ  - GI Prophylaxis: not needed  - Diet: DASH  - Activity: WBAT  - Code Status: Full   - Dispo: STR  vs home PT   NADINE BENEDICT 76y Female  MRN#: [id number]   CODE STATUS: Full       SUBJECTIVE  Patient is a 76y old Female who presents with a chief complaint of right hip fracture (12 Feb 2021 06:04)  Currently admitted to medicine with the primary diagnosis of Closed fracture of neck of right femur, initial encounter    Today is hospital day 3d, and this morning she is resting comfortably in bed. She was complaining of a little soreness in her hip,   No overnight events.         OBJECTIVE  PAST MEDICAL & SURGICAL HISTORY  Hyperlipemia    HTN (hypertension)    H/O breast implant      ALLERGIES:  No Known Allergies    MEDICATIONS:  STANDING MEDICATIONS  amLODIPine   Tablet 5 milliGRAM(s) Oral daily  ATENolol  Tablet 50 milliGRAM(s) Oral daily  heparin   Injectable 5000 Unit(s) SubCutaneous every 12 hours  polyethylene glycol 3350 17 Gram(s) Oral every 12 hours  potassium chloride    Tablet ER 40 milliEquivalent(s) Oral once  potassium chloride  20 mEq/100 mL IVPB 20 milliEquivalent(s) IV Intermittent every 2 hours  simvastatin 20 milliGRAM(s) Oral at bedtime    PRN MEDICATIONS  magnesium hydroxide Suspension 30 milliLiter(s) Oral every 6 hours PRN  senna 2 Tablet(s) Oral at bedtime PRN      VITAL SIGNS: Last 24 Hours  T(C): 36 (12 Feb 2021 04:58), Max: 36.9 (11 Feb 2021 14:21)  T(F): 96.8 (12 Feb 2021 04:58), Max: 98.4 (11 Feb 2021 14:21)  HR: 70 (12 Feb 2021 05:40) (70 - 94)  BP: 119/65 (12 Feb 2021 05:40) (102/52 - 127/59)  BP(mean): --  RR: 18 (12 Feb 2021 05:40) (18 - 18)  SpO2: --    LABS:                        12.4   9.48  )-----------( 188      ( 12 Feb 2021 06:47 )             35.9     02-12    139  |  97<L>  |  14  ----------------------------<  100<H>  2.8<L>   |  29  |  0.8    Ca    8.8      12 Feb 2021 06:47  Mg     1.5     02-12    TPro  5.2<L>  /  Alb  3.6  /  TBili  0.8  /  DBili  x   /  AST  50<H>  /  ALT  16  /  AlkPhos  67  02-12        PHYSICAL EXAM:  GENERAL: NAD, frail appearing, AAOx3  HEENT:  Atraumatic, Normocephalic. EOMI, conjunctiva and sclera clear, No JVD  PULMONARY: Clear to auscultation bilaterally; No wheeze  CARDIOVASCULAR: Regular rate and rhythm; No murmurs, rubs, or gallops  GASTROINTESTINAL: Soft, Nontender, Nondistended; Bowel sounds present  MUSCULOSKELETAL: No clubbing, cyanosis, or edema  NEUROLOGY: non-focal  SKIN: No rashes or lesions      ASSESSMENT & PLAN  77 yo f PMHx oh hypertension presented with right hip fracture.    # Right subcapital displaced impacted fracture 2/2 mechanical fall, no LOC   - CT head: no intracranial pathology   - X-ray Pelvis Right subcapital displaced impacted fracture in varus angulation.   - ortho consult  OR 2/10 R hip hemiarthroplasty   - Post op abx (ancef) for 24 hrs   - vit d 62, tsh 0.6  - pain control: percocet   - PT eval: rolling walker    # DLD:  - Lipid Profile: Chol 191, TG 67, HDL 79,    - c/w simvastatin 20  qd     # HTN  - c/w home meds    #Misc  - DVT Prophylaxis: heparin SQ  - GI Prophylaxis: not needed  - Diet: DASH  - Activity: WBAT  - Code Status: Full   - Dispo: STR  vs home PT   NADINE BENEDICT 76y Female  MRN#: [id number]   CODE STATUS: Full       SUBJECTIVE  Patient is a 76y old Female who presents with a chief complaint of right hip fracture (12 Feb 2021 06:04)  Currently admitted to medicine with the primary diagnosis of Closed fracture of neck of right femur, initial encounter    Today is hospital day 3d, and this morning she is resting comfortably in bed. She was complaining of a little soreness in her hip,   No overnight events.         OBJECTIVE  PAST MEDICAL & SURGICAL HISTORY  Hyperlipemia    HTN (hypertension)    H/O breast implant      ALLERGIES:  No Known Allergies    MEDICATIONS:  STANDING MEDICATIONS  amLODIPine   Tablet 5 milliGRAM(s) Oral daily  ATENolol  Tablet 50 milliGRAM(s) Oral daily  heparin   Injectable 5000 Unit(s) SubCutaneous every 12 hours  polyethylene glycol 3350 17 Gram(s) Oral every 12 hours  potassium chloride    Tablet ER 40 milliEquivalent(s) Oral once  potassium chloride  20 mEq/100 mL IVPB 20 milliEquivalent(s) IV Intermittent every 2 hours  simvastatin 20 milliGRAM(s) Oral at bedtime    PRN MEDICATIONS  magnesium hydroxide Suspension 30 milliLiter(s) Oral every 6 hours PRN  senna 2 Tablet(s) Oral at bedtime PRN      VITAL SIGNS: Last 24 Hours  T(C): 36 (12 Feb 2021 04:58), Max: 36.9 (11 Feb 2021 14:21)  T(F): 96.8 (12 Feb 2021 04:58), Max: 98.4 (11 Feb 2021 14:21)  HR: 70 (12 Feb 2021 05:40) (70 - 94)  BP: 119/65 (12 Feb 2021 05:40) (102/52 - 127/59)  BP(mean): --  RR: 18 (12 Feb 2021 05:40) (18 - 18)  SpO2: --    LABS:                        12.4   9.48  )-----------( 188      ( 12 Feb 2021 06:47 )             35.9     02-12    139  |  97<L>  |  14  ----------------------------<  100<H>  2.8<L>   |  29  |  0.8    Ca    8.8      12 Feb 2021 06:47  Mg     1.5     02-12    TPro  5.2<L>  /  Alb  3.6  /  TBili  0.8  /  DBili  x   /  AST  50<H>  /  ALT  16  /  AlkPhos  67  02-12        PHYSICAL EXAM:  GENERAL: NAD, frail appearing, AAOx3  HEENT:  Atraumatic, Normocephalic. EOMI, conjunctiva and sclera clear, No JVD  PULMONARY: Clear to auscultation bilaterally; No wheeze  CARDIOVASCULAR: Regular rate and rhythm; No murmurs, rubs, or gallops  GASTROINTESTINAL: Soft, Nontender, Nondistended; Bowel sounds present  MUSCULOSKELETAL: No clubbing, cyanosis, or edema  NEUROLOGY: non-focal  SKIN: No rashes or lesions

## 2021-02-12 NOTE — DISCHARGE NOTE PROVIDER - PROVIDER TOKENS
PROVIDER:[TOKEN:[[id number]]] PROVIDER:[TOKEN:[[id number]]],PROVIDER:[TOKEN:[[id number]]]

## 2021-02-12 NOTE — DISCHARGE NOTE PROVIDER - CARE PROVIDERS DIRECT ADDRESSES
,[email or web address]

## 2021-02-12 NOTE — DISCHARGE NOTE PROVIDER - HOSPITAL COURSE
75 yo F PMHx oh hypertension presented with right hip fracture. Patient was getting ready to get her colonoscopy today when she tripped with the rug and fell and hit her head and her right side .she denies any LOC ,palpitations, dizziness, blurry vision, seizure like activity.  Also denies any chest pain, cough, sob, N/V/D, dysuria, no other complaints. Of note: patient had been loosing weight 25 lbs for the last 1 year and was supposed to get colonoscopy for that reason and denies any dark/ bloody BM.  Constipation noted. patient's grandson had a wild cat inside the house for the last 1 year, fond out that it had "worms" which's she thinks the cause of her weight loss. She went for testing but never got the results. In the ED Vital Signs were WNL except for BP T(C): 36.4  HR: 69 BP: 167/72 RR: 16  SpO2: 98% . CT HEAD/ cervical spine : No acute intracranial findings. Small right parietal scalp hematoma without underlying calvarial fracture. Mild chronic microvascular ischemic changes. No acute fracture or dislocation. Trauma w/up showed Right subcapital displaced impacted fracture in varus angulation. Patient had right hip hemiarthroplasty on 2/10. She is working with physical therapy and will go to [facility] upon discharge.      75 yo F PMHx oh hypertension presented with right hip fracture. Patient was getting ready to get her colonoscopy today when she tripped with the rug and fell and hit her head and her right side .she denies any LOC ,palpitations, dizziness, blurry vision, seizure like activity.  Also denies any chest pain, cough, sob, N/V/D, dysuria, no other complaints. Of note: patient had been loosing weight 25 lbs for the last 1 year and was supposed to get colonoscopy for that reason and denies any dark/ bloody BM.  Constipation noted. patient's grandson had a wild cat inside the house for the last 1 year, fond out that it had "worms" which's she thinks the cause of her weight loss. She went for testing but never got the results. In the ED Vital Signs were WNL except for BP T(C): 36.4  HR: 69 BP: 167/72 RR: 16  SpO2: 98% . CT HEAD/ cervical spine : No acute intracranial findings. Small right parietal scalp hematoma without underlying calvarial fracture. Mild chronic microvascular ischemic changes. No acute fracture or dislocation. Trauma w/up showed Right subcapital displaced impacted fracture in varus angulation. Patient had right hip hemiarthroplasty on 2/10. She is working with physical therapy and will go to [facility] upon discharge.      77 yo F PMHx oh hypertension presented with right hip fracture. Patient was getting ready to get her colonoscopy today when she tripped with the rug and fell and hit her head and her right side .she denies any LOC ,palpitations, dizziness, blurry vision, seizure like activity.  Also denies any chest pain, cough, sob, N/V/D, dysuria, no other complaints. Of note: patient had been loosing weight 25 lbs for the last 1 year and was supposed to get colonoscopy for that reason and denies any dark/ bloody BM.  Constipation noted. patient's grandson had a wild cat inside the house for the last 1 year, fond out that it had "worms" which's she thinks the cause of her weight loss. She went for testing but never got the results. In the ED Vital Signs were WNL except for BP T(C): 36.4  HR: 69 BP: 167/72 RR: 16  SpO2: 98% . CT HEAD/ cervical spine : No acute intracranial findings. Small right parietal scalp hematoma without underlying calvarial fracture. Mild chronic microvascular ischemic changes. No acute fracture or dislocation. Trauma w/up showed Right subcapital displaced impacted fracture in varus angulation. Patient had right hip hemiarthroplasty on 2/10. She is working with physical therapy and will go to [facility] upon discharge.     Attending addendum: Patient seen and examined. Patient is stable for discharge. Patient needs to follow up with Dr. Esme Goodrich in one week. Patient noted to have hypokalemia and hypomagnesemia, likely due to magnesium deficiency induced by chlorthalidone. Patient advised to discontinue thiazide and atenolol, started on lisinopril and to continue amlodipine. Follow up with PMD in 1-2 weeks.

## 2021-02-13 VITALS
RESPIRATION RATE: 18 BRPM | TEMPERATURE: 99 F | DIASTOLIC BLOOD PRESSURE: 70 MMHG | HEART RATE: 93 BPM | SYSTOLIC BLOOD PRESSURE: 144 MMHG

## 2021-02-13 LAB
ALBUMIN SERPL ELPH-MCNC: 3.6 G/DL — SIGNIFICANT CHANGE UP (ref 3.5–5.2)
ALP SERPL-CCNC: 65 U/L — SIGNIFICANT CHANGE UP (ref 30–115)
ALT FLD-CCNC: 12 U/L — SIGNIFICANT CHANGE UP (ref 0–41)
ANION GAP SERPL CALC-SCNC: 10 MMOL/L — SIGNIFICANT CHANGE UP (ref 7–14)
AST SERPL-CCNC: 39 U/L — SIGNIFICANT CHANGE UP (ref 0–41)
BASOPHILS # BLD AUTO: 0.03 K/UL — SIGNIFICANT CHANGE UP (ref 0–0.2)
BASOPHILS NFR BLD AUTO: 0.3 % — SIGNIFICANT CHANGE UP (ref 0–1)
BILIRUB SERPL-MCNC: 1 MG/DL — SIGNIFICANT CHANGE UP (ref 0.2–1.2)
BUN SERPL-MCNC: 14 MG/DL — SIGNIFICANT CHANGE UP (ref 10–20)
CALCIUM SERPL-MCNC: 10.4 MG/DL — HIGH (ref 8.5–10.1)
CHLORIDE SERPL-SCNC: 98 MMOL/L — SIGNIFICANT CHANGE UP (ref 98–110)
CO2 SERPL-SCNC: 28 MMOL/L — SIGNIFICANT CHANGE UP (ref 17–32)
CREAT SERPL-MCNC: 0.6 MG/DL — LOW (ref 0.7–1.5)
EOSINOPHIL # BLD AUTO: 0.36 K/UL — SIGNIFICANT CHANGE UP (ref 0–0.7)
EOSINOPHIL NFR BLD AUTO: 4 % — SIGNIFICANT CHANGE UP (ref 0–8)
GLUCOSE SERPL-MCNC: 165 MG/DL — HIGH (ref 70–99)
HCT VFR BLD CALC: 36 % — LOW (ref 37–47)
HGB BLD-MCNC: 12.7 G/DL — SIGNIFICANT CHANGE UP (ref 12–16)
IMM GRANULOCYTES NFR BLD AUTO: 0.3 % — SIGNIFICANT CHANGE UP (ref 0.1–0.3)
LYMPHOCYTES # BLD AUTO: 1.12 K/UL — LOW (ref 1.2–3.4)
LYMPHOCYTES # BLD AUTO: 12.5 % — LOW (ref 20.5–51.1)
MAGNESIUM SERPL-MCNC: 1.7 MG/DL — LOW (ref 1.8–2.4)
MCHC RBC-ENTMCNC: 30.7 PG — SIGNIFICANT CHANGE UP (ref 27–31)
MCHC RBC-ENTMCNC: 35.3 G/DL — SIGNIFICANT CHANGE UP (ref 32–37)
MCV RBC AUTO: 87 FL — SIGNIFICANT CHANGE UP (ref 81–99)
MONOCYTES # BLD AUTO: 0.71 K/UL — HIGH (ref 0.1–0.6)
MONOCYTES NFR BLD AUTO: 7.9 % — SIGNIFICANT CHANGE UP (ref 1.7–9.3)
NEUTROPHILS # BLD AUTO: 6.69 K/UL — HIGH (ref 1.4–6.5)
NEUTROPHILS NFR BLD AUTO: 75 % — SIGNIFICANT CHANGE UP (ref 42.2–75.2)
NRBC # BLD: 0 /100 WBCS — SIGNIFICANT CHANGE UP (ref 0–0)
PLATELET # BLD AUTO: 223 K/UL — SIGNIFICANT CHANGE UP (ref 130–400)
POTASSIUM SERPL-MCNC: 4.1 MMOL/L — SIGNIFICANT CHANGE UP (ref 3.5–5)
POTASSIUM SERPL-SCNC: 4.1 MMOL/L — SIGNIFICANT CHANGE UP (ref 3.5–5)
PROT SERPL-MCNC: 5.8 G/DL — LOW (ref 6–8)
RBC # BLD: 4.14 M/UL — LOW (ref 4.2–5.4)
RBC # FLD: 12.4 % — SIGNIFICANT CHANGE UP (ref 11.5–14.5)
SODIUM SERPL-SCNC: 136 MMOL/L — SIGNIFICANT CHANGE UP (ref 135–146)
WBC # BLD: 8.94 K/UL — SIGNIFICANT CHANGE UP (ref 4.8–10.8)
WBC # FLD AUTO: 8.94 K/UL — SIGNIFICANT CHANGE UP (ref 4.8–10.8)

## 2021-02-13 RX ORDER — ACETAMINOPHEN 500 MG
1 TABLET ORAL
Qty: 120 | Refills: 0
Start: 2021-02-13 | End: 2021-03-14

## 2021-02-13 RX ORDER — ATENOLOL AND CHLORTHALIDONE 50; 25 MG/1; MG/1
1 TABLET ORAL
Qty: 0 | Refills: 0 | DISCHARGE

## 2021-02-13 RX ORDER — MAGNESIUM SULFATE 500 MG/ML
2 VIAL (ML) INJECTION ONCE
Refills: 0 | Status: COMPLETED | OUTPATIENT
Start: 2021-02-13 | End: 2021-02-13

## 2021-02-13 RX ORDER — LISINOPRIL 2.5 MG/1
1 TABLET ORAL
Qty: 30 | Refills: 0
Start: 2021-02-13 | End: 2021-03-14

## 2021-02-13 RX ADMIN — Medication 40 MILLIEQUIVALENT(S): at 05:55

## 2021-02-13 RX ADMIN — ATENOLOL 50 MILLIGRAM(S): 25 TABLET ORAL at 05:52

## 2021-02-13 RX ADMIN — POLYETHYLENE GLYCOL 3350 17 GRAM(S): 17 POWDER, FOR SOLUTION ORAL at 05:52

## 2021-02-13 RX ADMIN — AMLODIPINE BESYLATE 5 MILLIGRAM(S): 2.5 TABLET ORAL at 05:52

## 2021-02-13 RX ADMIN — HEPARIN SODIUM 5000 UNIT(S): 5000 INJECTION INTRAVENOUS; SUBCUTANEOUS at 05:52

## 2021-02-13 RX ADMIN — Medication 25 GRAM(S): at 11:24

## 2021-02-13 RX ADMIN — SIMVASTATIN 20 MILLIGRAM(S): 20 TABLET, FILM COATED ORAL at 22:55

## 2021-02-13 NOTE — PROGRESS NOTE ADULT - REASON FOR ADMISSION
right hip fracture

## 2021-02-13 NOTE — PROGRESS NOTE ADULT - PROVIDER SPECIALTY LIST ADULT
Orthopedics
Internal Medicine
Orthopedics
Internal Medicine
Orthopedics

## 2021-02-13 NOTE — PROGRESS NOTE ADULT - SUBJECTIVE AND OBJECTIVE BOX
NADINE BENEDICT 76y Female  MRN#: [id number]   CODE STATUS: Full       SUBJECTIVE  Patient is a 76y old Female who presents with a chief complaint of right hip fracture (12 Feb 2021 15:35)  Currently admitted to medicine with the primary diagnosis of Closed fracture of neck of right femur, initial encounter    Today is hospital day 4d, and this morning she is resting comfortably in bed with no complaints. Patient wants to go home once walker is delivered.   No overnight events.         OBJECTIVE  PAST MEDICAL & SURGICAL HISTORY  Hyperlipemia    HTN (hypertension)    H/O breast implant      ALLERGIES:  No Known Allergies    MEDICATIONS:  STANDING MEDICATIONS  amLODIPine   Tablet 5 milliGRAM(s) Oral daily  ATENolol  Tablet 50 milliGRAM(s) Oral daily  heparin   Injectable 5000 Unit(s) SubCutaneous every 12 hours  polyethylene glycol 3350 17 Gram(s) Oral every 12 hours  simvastatin 20 milliGRAM(s) Oral at bedtime    PRN MEDICATIONS  magnesium hydroxide Suspension 30 milliLiter(s) Oral every 6 hours PRN  oxycodone    5 mG/acetaminophen 325 mG 1 Tablet(s) Oral every 6 hours PRN  senna 2 Tablet(s) Oral at bedtime PRN      VITAL SIGNS: Last 24 Hours  T(C): 37.4 (13 Feb 2021 04:56), Max: 37.4 (13 Feb 2021 04:42)  T(F): 99.4 (13 Feb 2021 04:56), Max: 99.4 (13 Feb 2021 04:42)  HR: 97 (13 Feb 2021 04:56) (83 - 97)  BP: 146/78 (13 Feb 2021 04:56) (140/66 - 146/78)  BP(mean): --  RR: 18 (13 Feb 2021 04:56) (18 - 18)  SpO2: 95% (12 Feb 2021 20:11) (95% - 95%)    LABS:                        12.4   9.48  )-----------( 188      ( 12 Feb 2021 06:47 )             35.9     02-12    139  |  97<L>  |  14  ----------------------------<  100<H>  2.8<L>   |  29  |  0.8    Ca    8.8      12 Feb 2021 06:47  Mg     1.5     02-12    TPro  5.2<L>  /  Alb  3.6  /  TBili  0.8  /  DBili  x   /  AST  50<H>  /  ALT  16  /  AlkPhos  67  02-12      PHYSICAL EXAM:  GENERAL: NAD, frail appearing, AAOx3  HEENT:  Atraumatic, Normocephalic. EOMI, conjunctiva and sclera clear, No JVD  PULMONARY: Clear to auscultation bilaterally; No wheeze  CARDIOVASCULAR: Regular rate and rhythm; No murmurs, rubs, or gallops  GASTROINTESTINAL: Soft, Nontender, Nondistended; Bowel sounds present  MUSCULOSKELETAL: No clubbing, cyanosis, or edema  NEUROLOGY: non-focal  SKIN: No rashes or lesions    ASSESSMENT & PLAN  77 yo f PMHx oh hypertension presented with right hip fracture.    # Right subcapital displaced impacted fracture 2/2 mechanical fall, no LOC   - CT head: no intracranial pathology   - X-ray Pelvis Right subcapital displaced impacted fracture in varus angulation.   - ortho consult  OR 2/10 R hip hemiarthroplasty   - Post op abx (ancef) for 24 hrs   - vit d 62, tsh 0.6  - pain control: percocet   - PT eval: rolling walker, home with home PT     # DLD:  - Lipid Profile: Chol 191, TG 67, HDL 79,    - c/w simvastatin 20  qd     # HTN  - c/w home meds    #Misc  - DVT Prophylaxis: heparin SQ  - GI Prophylaxis: not needed  - Diet: DASH  - Activity: WBAT  - Code Status: Full   - Dispo: home PT

## 2021-02-13 NOTE — PROGRESS NOTE ADULT - ASSESSMENT
Orthopaedic Surgery Progress Note    SUBJECTIVE  No acute events overnight.   Pt was seen and examined by the orthopedic surgery team during morning rounds. Doing well. Normal Vitals. Tolerating diet. Making adequate urine.   Denies n/v, abdominal pain, diarrhea or any other major complaints.    --------------------------------------    T(C): 37.4 (02-13-21 @ 04:56), Max: 37.4 (02-13-21 @ 04:42)  HR: 97 (02-13-21 @ 04:56) (83 - 97)  BP: 146/78 (02-13-21 @ 04:56) (140/66 - 146/78)  RR: 18 (02-13-21 @ 04:56) (18 - 18)  SpO2: 95% (02-12-21 @ 20:11) (95% - 95%)    P/E:  AOx3, NAD  Nonlabored breathing    RLE  Dressing C/D/I  EHL/FHL/GA/TA Motor intact  SP/DP/T/S/S SILT distally  Toes WWP    --------------------------------------    Labs                        12.7   8.94  )-----------( 223      ( 13 Feb 2021 08:31 )             36.0     02-13    136  |  98  |  14  ----------------------------<  165<H>  4.1   |  28  |  0.6<L>    Ca    10.4<H>      13 Feb 2021 08:31  Mg     1.7     02-13    TPro  5.8<L>  /  Alb  3.6  /  TBili  1.0  /  DBili  x   /  AST  39  /  ALT  12  /  AlkPhos  65  02-13    LIVER FUNCTIONS - ( 13 Feb 2021 08:31 )  Alb: 3.6 g/dL / Pro: 5.8 g/dL / ALK PHOS: 65 U/L / ALT: 12 U/L / AST: 39 U/L / GGT: x               --------------------------------------    A/P:   76F POD#3 s/p R hip doni arthroplasty, doing well postop      Weightbearing status: WBAT RLE  - OOBTC twice per day w/ assistance  Pain control: per protocol  DVT ppx: scd's + HSQ  Abx: completed course of postop abx, ancef  Labs/Drains: trend H/H  - Repeat labs in AM  Dressing changes: to be performed by the orthopaedic surgery service  Home Meds: please reinstate as appropriate  --------------------------------------    Disposition:   PT rec: home w/ home PT  --------------------------------------    Following discharge, please schedule patient for outpatient follow-up    	Orthopedic  Joint reconstruction clinic, 1 week post discharge   	 Dr. Blair Ayala                           
  ASSESSMENT & PLAN  77 yo f PMHx oh hypertension presented with right hip fracture.    # Right subcapital displaced impacted fracture 2/2 mechanical fall, no LOC   - CT head: no intracranial pathology   - X-ray Pelvis Right subcapital displaced impacted fracture in varus angulation.   - ortho consult  OR 2/10 R hip hemiarthroplasty   - Post op abx (ancef) for 24 hrs   - vit d 62, tsh 0.6  - pain control: percocet   - PT eval: rolling walker    # DLD:  - Lipid Profile: Chol 191, TG 67, HDL 79,    - c/w simvastatin 20  qd     # HTN  - c/w home meds    #Misc  - DVT Prophylaxis: heparin SQ  - GI Prophylaxis: not needed  - Diet: DASH  - Activity: WBAT  - Code Status: Full   - Dispo: STR  vs home PT

## 2021-02-17 PROBLEM — E78.5 HYPERLIPIDEMIA, UNSPECIFIED: Chronic | Status: ACTIVE | Noted: 2021-02-09

## 2021-02-17 PROBLEM — I10 ESSENTIAL (PRIMARY) HYPERTENSION: Chronic | Status: ACTIVE | Noted: 2021-02-09

## 2021-02-19 DIAGNOSIS — E78.5 HYPERLIPIDEMIA, UNSPECIFIED: ICD-10-CM
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
## 2021-02-19 DIAGNOSIS — E87.6 HYPOKALEMIA: ICD-10-CM

## 2021-02-19 DIAGNOSIS — R63.4 ABNORMAL WEIGHT LOSS: ICD-10-CM

## 2021-02-19 DIAGNOSIS — N17.9 ACUTE KIDNEY FAILURE, UNSPECIFIED: ICD-10-CM

## 2021-02-19 DIAGNOSIS — K59.00 CONSTIPATION, UNSPECIFIED: ICD-10-CM

## 2021-02-19 DIAGNOSIS — Z98.82 BREAST IMPLANT STATUS: ICD-10-CM

## 2021-02-19 DIAGNOSIS — D72.829 ELEVATED WHITE BLOOD CELL COUNT, UNSPECIFIED: ICD-10-CM

## 2021-02-19 DIAGNOSIS — S72.011A UNSPECIFIED INTRACAPSULAR FRACTURE OF RIGHT FEMUR, INITIAL ENCOUNTER FOR CLOSED FRACTURE: ICD-10-CM

## 2021-02-19 DIAGNOSIS — Z87.891 PERSONAL HISTORY OF NICOTINE DEPENDENCE: ICD-10-CM

## 2021-02-19 DIAGNOSIS — M17.11 UNILATERAL PRIMARY OSTEOARTHRITIS, RIGHT KNEE: ICD-10-CM

## 2021-02-19 DIAGNOSIS — E87.2 ACIDOSIS: ICD-10-CM

## 2021-02-19 DIAGNOSIS — S00.03XA CONTUSION OF SCALP, INITIAL ENCOUNTER: ICD-10-CM

## 2021-02-19 DIAGNOSIS — E83.42 HYPOMAGNESEMIA: ICD-10-CM

## 2021-02-19 DIAGNOSIS — I10 ESSENTIAL (PRIMARY) HYPERTENSION: ICD-10-CM

## 2021-02-19 DIAGNOSIS — T50.2X5A ADVERSE EFFECT OF CARBONIC-ANHYDRASE INHIBITORS, BENZOTHIADIAZIDES AND OTHER DIURETICS, INITIAL ENCOUNTER: ICD-10-CM
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
## 2021-02-26 PROBLEM — Z00.00 ENCOUNTER FOR PREVENTIVE HEALTH EXAMINATION: Status: ACTIVE | Noted: 2021-02-26

## 2021-03-08 ENCOUNTER — APPOINTMENT (OUTPATIENT)
Dept: INTERNAL MEDICINE | Facility: CLINIC | Age: 76
End: 2021-03-08